# Patient Record
Sex: FEMALE | Race: WHITE
[De-identification: names, ages, dates, MRNs, and addresses within clinical notes are randomized per-mention and may not be internally consistent; named-entity substitution may affect disease eponyms.]

---

## 2018-01-17 ENCOUNTER — HOSPITAL ENCOUNTER (EMERGENCY)
Dept: HOSPITAL 25 - ED | Age: 33
Discharge: HOME | End: 2018-01-17
Payer: MEDICARE

## 2018-01-17 VITALS — SYSTOLIC BLOOD PRESSURE: 145 MMHG | DIASTOLIC BLOOD PRESSURE: 67 MMHG

## 2018-01-17 DIAGNOSIS — J18.9: ICD-10-CM

## 2018-01-17 DIAGNOSIS — Z88.3: ICD-10-CM

## 2018-01-17 DIAGNOSIS — Z88.8: ICD-10-CM

## 2018-01-17 DIAGNOSIS — Z88.0: ICD-10-CM

## 2018-01-17 DIAGNOSIS — R07.89: ICD-10-CM

## 2018-01-17 DIAGNOSIS — J45.909: Primary | ICD-10-CM

## 2018-01-17 LAB
BASOPHILS # BLD AUTO: 0.1 10^3/UL (ref 0–0.2)
EOSINOPHIL # BLD AUTO: 0.1 10^3/UL (ref 0–0.6)
HCT VFR BLD AUTO: 37 % (ref 35–47)
HGB BLD-MCNC: 12.7 G/DL (ref 12–16)
INR PPP/BLD: 0.91 (ref 0.77–1.02)
LYMPHOCYTES # BLD AUTO: 3.1 10^3/UL (ref 1–4.8)
MCH RBC QN AUTO: 31 PG (ref 27–31)
MCHC RBC AUTO-ENTMCNC: 35 G/DL (ref 31–36)
MCV RBC AUTO: 89 FL (ref 80–97)
MONOCYTES # BLD AUTO: 1.1 10^3/UL (ref 0–0.8)
NEUTROPHILS # BLD AUTO: 12.8 10^3/UL (ref 1.5–7.7)
NRBC # BLD AUTO: 0 10^3/UL
NRBC BLD QL AUTO: 0
PLATELET # BLD AUTO: 250 10^3/UL (ref 150–450)
RBC # BLD AUTO: 4.15 10^6/UL (ref 4–5.4)
WBC # BLD AUTO: 17.2 10^3/UL (ref 3.5–10.8)

## 2018-01-17 PROCEDURE — 99284 EMERGENCY DEPT VISIT MOD MDM: CPT

## 2018-01-17 PROCEDURE — 85025 COMPLETE CBC W/AUTO DIFF WBC: CPT

## 2018-01-17 PROCEDURE — 93005 ELECTROCARDIOGRAM TRACING: CPT

## 2018-01-17 PROCEDURE — 84702 CHORIONIC GONADOTROPIN TEST: CPT

## 2018-01-17 PROCEDURE — 71045 X-RAY EXAM CHEST 1 VIEW: CPT

## 2018-01-17 PROCEDURE — 83880 ASSAY OF NATRIURETIC PEPTIDE: CPT

## 2018-01-17 PROCEDURE — 84484 ASSAY OF TROPONIN QUANT: CPT

## 2018-01-17 PROCEDURE — 80053 COMPREHEN METABOLIC PANEL: CPT

## 2018-01-17 PROCEDURE — 85730 THROMBOPLASTIN TIME PARTIAL: CPT

## 2018-01-17 PROCEDURE — 36415 COLL VENOUS BLD VENIPUNCTURE: CPT

## 2018-01-17 PROCEDURE — 85379 FIBRIN DEGRADATION QUANT: CPT

## 2018-01-17 PROCEDURE — 85610 PROTHROMBIN TIME: CPT

## 2018-01-17 PROCEDURE — 94640 AIRWAY INHALATION TREATMENT: CPT

## 2018-01-17 PROCEDURE — 87040 BLOOD CULTURE FOR BACTERIA: CPT

## 2018-01-17 NOTE — RAD
INDICATION: Short of breath     



COMPARISON: None

 

TECHNIQUE: An AP portable view obtained at 0410 hours is submitted.



FINDINGS: 



Bones/Soft Tissues: There are no acute bony findings.    



Cardiomediastinal: The cardiomediastinal silhouette is normal. 



Lungs: There are no infiltrates.



Pleura: There are no pleural effusions. 



Other: None



IMPRESSION:  NO ACTIVE DISEASE.

## 2018-01-18 NOTE — ED
Deepak OTOOLE Nilda, scribed for Allison Crowley MD on 01/17/18 at 0354 .





HPI Chest Pain





- HPI Summary


HPI Summary: 


This patient is a 32 year old F BIBA with a chief complaint of constant chest 

tightness since last night. The patient rates the pain 7/10 in severity. 

Symptoms aggravated by deep inspiration. Patient reports cough, SOB, and 

dizziness, but denies fever. She states she believes this is not a panic attack 

since she was at rest when symptoms occurred. PMHx includes asthma, fibromyalgia

, PCOS, panic attacks, heart murmur. Medications include Spirinol. Pt notes she 

has been on birth control for years, which was discontinued 8 months ago. 





- History of Current Complaint


Chief Complaint: EDShortnessOfBreath


Time Seen by Provider: 01/17/18 03:03


Hx Obtained From: Patient


Hx Last Menstrual Period: Birth Control Pill


Onset/Duration: Started Hours Ago, Still Present


Timing: Constant


Current Severity: Severe


Pain Intensity: 7


Pain Scale Used: 0-10 Numeric


Chest Pain Location: Diffuse


Chest Pain Radiates: No


Character: Dyspnea at Rest


Aggravating Factor(s): Deep Breaths


Associated Signs and Symptoms: Positive: Other: - cough with deep inspiration, 

SOB, and dizziness, but denies fever.





- Allergy/Home Medications


Allergies/Adverse Reactions: 


 Allergies











Allergy/AdvReac Type Severity Reaction Status Date / Time


 


Clindamycin Allergy Intermediate Hives Verified 07/29/13 12:17


 


Latex Allergy Intermediate Rash Verified 07/29/13 12:16


 


Penicillins Allergy Intermediate Nausea Verified 07/29/13 12:16


 


Bupropion [From Wellbutrin] Allergy  See Comment Verified 07/29/13 12:17


 


Carisoprodol [From Soma] Allergy  Vertigo Verified 07/29/13 12:18


 


Ciprofloxacin [From Cipro] Allergy  Hives Verified 07/29/13 12:18


 


Diphenhydramine Allergy  See Comment Verified 06/27/16 10:32





[From Benadryl]     


 


cockroach Allergy  Unknown Uncoded 06/27/16 10:31





   Reaction  





   Details  














PMH/Surg Hx/FS Hx/Imm Hx


Endocrine/Hematology History: 


   Denies: Hx Diabetes, Hx Thyroid Disease


Cardiovascular History: Reports: Other Cardiovascular Problems/Disorders - 

Heart Murmur


   Denies: Hx Hypertension


Respiratory History: Reports: Hx Asthma - Mild exercise induced


   Denies: Hx Chronic Obstructive Pulmonary Disease (COPD)


GI History: 


   Denies: Hx Ulcer


 History: Reports: Other  Problems/Disorders - PCOS


Musculoskeletal History: Reports: Hx Fibromyalgia


Psychiatric History: Reports: Other Psychiatric Issues/Disorders - Panic attack





- Surgical History


Surgery Procedure, Year, and Place: Cyst removal back of head 2008, Gallbladder 

removal, ganglion cyst removed from left wrist, tonsillectomy 1989.





- Immunization History


Date of Influenza Vaccine: none


Infectious Disease History: No


Infectious Disease History: 


   Denies: Hx Clostridium Difficile, Hx Hepatitis, Hx Human Immunodeficiency 

Virus (HIV), Hx of Known/Suspected MRSA, Hx Shingles, Hx Tuberculosis, Hx Known/

Suspected VRSA, History Other Infectious Disease, Traveled Outside the [place] in 

Last 30 Days





- Family History


Known Family History: Positive: Cardiac Disease





- Social History


Alcohol Use: None


Substance Use Type: Reports: None


Substance Use Comment - Amount & Last Used: one inhalation 2 weeks ago


Smoking Status (MU): Never Smoked Tobacco





Review of Systems


Negative: Fever


Positive: Chest Pain - tightness


Positive: Shortness Of Breath, Cough - with deep inspiration


Positive: Other - indigestion


Neurological: Other - dizziness


All Other Systems Reviewed And Are Negative: Yes





Physical Exam





- Summary


Physical Exam Summary: 


VITAL SIGNS: Reviewed.


GENERAL:  Patient is a well-developed and nourished female who is lying 

comfortable in the stretcher. Patient is not in any acute respiratory distress.


HEAD AND FACE: No signs of trauma. No ecchymosis, hematomas or skull 

depressions. No sinus tenderness.


EYES: PERRLA, EOMI x 2, No injected conjunctiva, no nystagmus.


EARS: Hearing grossly intact. Ear canals and tympanic membranes are within 

normal limits.


MOUTH: Oropharynx within normal limits.


NECK: Supple, trachea is midline, no adenopathy, no JVD, no carotid bruit, no c-

spine tenderness, neck with full ROM.


CHEST: Symmetric, no tenderness at palpation


LUNGS: Clear to auscultation bilaterally. No wheezing or crackles. Decreased 

breath sounds bilat.


CVS: Mild Tachycardia. S1 and S2 present, no murmurs or gallops appreciated.


ABDOMEN: Soft, non-tender. No signs of distention. No rebound no guarding, and 

no masses palpated. Bowel sounds are normal.


EXTREMITIES: FROM in all major joints, no edema, no cyanosis or clubbing.


NEURO: Alert and oriented x 3. No acute neurological deficits. Speech is normal 

and follows commands.


Triage Information Reviewed: Yes


Vital Signs On Initial Exam: 


 Initial Vitals











Temp Pulse Resp BP Pulse Ox


 


 97.8 F   114   12   159/97   96 


 


 01/17/18 03:02  01/17/18 03:02  01/17/18 03:02  01/17/18 03:02  01/17/18 03:02











Vital Signs Reviewed: Yes





- Stan Coma Scale


Coma Scale Total: 15





Diagnostics





- Vital Signs


 Vital Signs











  Temp Pulse Resp BP Pulse Ox


 


 01/17/18 03:35   108  16   94


 


 01/17/18 03:23   111  19   96


 


 01/17/18 03:02  97.8 F  114  12  159/97  96














- Laboratory


Result Diagrams: 


 01/17/18 03:40





 01/17/18 03:40


Lab Statement: Any lab studies that have been ordered have been reviewed, and 

results considered in the medical decision making process.





- Radiology


  ** CXR


Radiology Interpretation Completed By: ED Physician - Bilat basal atelectasis 

vs infiltrate.





- EKG


  ** 0349


Cardiac Rate: Tachycardia


EKG Rhythm: Sinus Tachycardia - 127 bpm


EKG Interpretation: normal axis, normal interval, nonspecific T- wave changes. 





Re-Evaluation





- Re-Evaluation


  ** First Eval


Re-Evaluation Time: 04:42


Comment: Reviewed labs and imaging results with pt.





Chest Pain Course/Dx





- Course


Assessment/Plan: This pt is a 32 F with Hx of asthma having difficulty 

breathing and coughing.  Exam revealed decreased breath sound bilat. CXR showed 

atelectasis vs infiltrate. Pt given abx to be used along with inhaler. Pt will 

be D/C with Dx of PNA and asthma. Pt advised to follow up with PCP.





- Diagnoses


Provider Diagnoses: 


 Asthma, Pneumonia








Discharge





- Discharge Plan


Condition: Stable


Disposition: HOME


Prescriptions: 


Albuterol HFA INHALER* [Ventolin HFA Inhaler*] 2 puff INH Q6H PRN #1 mdi


 PRN Reason: Dyspnea


Levofloxacin TAB* [Levaquin TAB*] 750 mg PO DAILY #7 tab


predniSONE TAB* [Deltasone TAB*] 40 mg PO DAILY #10 tab


Patient Education Materials:  Asthma (ED), Pneumonia (ED)


Referrals: 


[facility] [place] [facility]YS[facility]IAN REFERRAL [Outside] - 3 Days


Additional Instructions: 


RETURN TO THE EMERGENCY DEPARTMENT FOR CHANGING OR WORSENING SYMPTOMS.





The documentation as recorded by the scribDeepak mark Nilda accurately 

reflects the service I personally performed and the decisions made by me, Allison Crowley MD.

## 2018-01-24 ENCOUNTER — HOSPITAL ENCOUNTER (EMERGENCY)
Dept: HOSPITAL 25 - ED | Age: 33
Discharge: HOME | End: 2018-01-24
Payer: MEDICARE

## 2018-01-24 ENCOUNTER — HOSPITAL ENCOUNTER (EMERGENCY)
Dept: HOSPITAL 25 - UCEAST | Age: 33
Discharge: LEFT BEFORE DISCHARGE | End: 2018-01-24
Payer: MEDICARE

## 2018-01-24 VITALS — DIASTOLIC BLOOD PRESSURE: 79 MMHG | SYSTOLIC BLOOD PRESSURE: 147 MMHG

## 2018-01-24 VITALS — DIASTOLIC BLOOD PRESSURE: 80 MMHG | SYSTOLIC BLOOD PRESSURE: 130 MMHG

## 2018-01-24 DIAGNOSIS — Z53.21: ICD-10-CM

## 2018-01-24 DIAGNOSIS — J20.9: Primary | ICD-10-CM

## 2018-01-24 DIAGNOSIS — R06.02: ICD-10-CM

## 2018-01-24 DIAGNOSIS — R50.9: ICD-10-CM

## 2018-01-24 DIAGNOSIS — R11.0: ICD-10-CM

## 2018-01-24 DIAGNOSIS — J06.9: Primary | ICD-10-CM

## 2018-01-24 LAB
BASOPHILS # BLD AUTO: 0.1 10^3/UL (ref 0–0.2)
EOSINOPHIL # BLD AUTO: 0.3 10^3/UL (ref 0–0.6)
HCT VFR BLD AUTO: 40 % (ref 35–47)
HGB BLD-MCNC: 13.8 G/DL (ref 12–16)
LYMPHOCYTES # BLD AUTO: 4.4 10^3/UL (ref 1–4.8)
MCH RBC QN AUTO: 31 PG (ref 27–31)
MCHC RBC AUTO-ENTMCNC: 34 G/DL (ref 31–36)
MCV RBC AUTO: 89 FL (ref 80–97)
MONOCYTES # BLD AUTO: 1.1 10^3/UL (ref 0–0.8)
NEUTROPHILS # BLD AUTO: 8.8 10^3/UL (ref 1.5–7.7)
NRBC # BLD AUTO: 0 10^3/UL
NRBC BLD QL AUTO: 0.1
PLATELET # BLD AUTO: 290 10^3/UL (ref 150–450)
RBC # BLD AUTO: 4.51 10^6/UL (ref 4–5.4)
WBC # BLD AUTO: 14.7 10^3/UL (ref 3.5–10.8)

## 2018-01-24 PROCEDURE — 85025 COMPLETE CBC W/AUTO DIFF WBC: CPT

## 2018-01-24 PROCEDURE — 71046 X-RAY EXAM CHEST 2 VIEWS: CPT

## 2018-01-24 PROCEDURE — 87502 INFLUENZA DNA AMP PROBE: CPT

## 2018-01-24 PROCEDURE — 36415 COLL VENOUS BLD VENIPUNCTURE: CPT

## 2018-01-24 PROCEDURE — 80048 BASIC METABOLIC PNL TOTAL CA: CPT

## 2018-01-24 PROCEDURE — 99282 EMERGENCY DEPT VISIT SF MDM: CPT

## 2018-01-24 NOTE — RAD
Indication: Cough.



2 views of the chest including dual energy PA views demonstrate no mediastinal shift.

Heart is of normal size and configuration. Lung fields appear clear.



When compared to previous exam of January 17, 2018 no significant change is noted.



IMPRESSION: No active cardiopulmonary disease is noted.

## 2018-01-25 NOTE — ED
rAacely OTOOLE Julia, scribed for Noel Dennis MD on 01/24/18 at 2032 .





Complex/Multi-Sys Presentation





- HPI Summary


HPI Summary: 





This patient is a 32 year old F presenting to [facility] accompanied by family with 

a chief complaint of intermittent fever of since last 1/16/18. Patient reports 

lung pain, nausea, and recent difficult breathing.  The patient rates the pain 

6/10 in severity. Symptoms alleviated by medications. Patient was recently seen 

in ED and was given Levaquin and Prednisone, which gave relief initially but 

symptoms are worsening.  Pt given antibiotics on 1/22/18. She sates she would 

like her potassium levels checked.





- History Of Current Complaint


Chief Complaint: EDFever


Time Seen by Provider: 01/24/18 20:17


Hx Obtained From: Patient


Onset/Duration: Gradual Onset, Lasting Weeks


Timing: Intermittent, Lasting: - fever


Location: Pain At: - lungs


Aggravating Factor(s): nothing


Alleviating Factor(s): medications


Associated Signs And Symptoms: Positive: SOB, Nausea, Fever





- Allergies/Home Medications


Allergies/Adverse Reactions: 


 Allergies











Allergy/AdvReac Type Severity Reaction Status Date / Time


 


Clindamycin Allergy Intermediate Hives Verified 07/29/13 12:17


 


Latex Allergy Intermediate Rash Verified 07/29/13 12:16


 


Penicillins Allergy Intermediate Nausea Verified 07/29/13 12:16


 


Bupropion [From Wellbutrin] Allergy  See Comment Verified 07/29/13 12:17


 


Carisoprodol [From Soma] Allergy  Vertigo Verified 07/29/13 12:18


 


Ciprofloxacin [From Cipro] Allergy  Hives Verified 07/29/13 12:18


 


Diphenhydramine Allergy  See Comment Verified 06/27/16 10:32





[From Benadryl]     


 


cockroach Allergy  Unknown Uncoded 06/27/16 10:31





   Reaction  





   Details  














PMH/Surg Hx/FS Hx/Imm Hx


Endocrine/Hematology History: 


   Denies: Hx Diabetes, Hx Thyroid Disease


Cardiovascular History: Reports: Other Cardiovascular Problems/Disorders - 

Heart Murmur


   Denies: Hx Hypertension


Respiratory History: Reports: Hx Asthma - Mild exercise induced


   Denies: Hx Chronic Obstructive Pulmonary Disease (COPD)


GI History: 


   Denies: Hx Ulcer


 History: Reports: Other  Problems/Disorders - PCOS


Musculoskeletal History: Reports: Hx Fibromyalgia


Neurological History: Reports: Other Neuro Impairments/Disorders - un dx 

seizures 


Psychiatric History: Reports: Other Psychiatric Issues/Disorders - Panic attack





- Surgical History


Surgery Procedure, Year, and Place: Cyst removal back of head 2008, Gallbladder 

removal, ganglion cyst removed from left wrist, tonsillectomy 1989.





- Immunization History


Date of Influenza Vaccine: none


Infectious Disease History: No


Infectious Disease History: 


   Denies: Hx Clostridium Difficile, Hx Hepatitis, Hx Human Immunodeficiency 

Virus (HIV), Hx of Known/Suspected MRSA, Hx Shingles, Hx Tuberculosis, Hx Known/

Suspected VRSA, History Other Infectious Disease, Traveled Outside the [place] in 

Last 30 Days





- Family History


Known Family History: Positive: Cardiac Disease





- Social History


Alcohol Use: None


Substance Use Type: Reports: None


Substance Use Comment - Amount & Last Used: one inhalation 2 weeks ago


Smoking Status (MU): Never Smoked Tobacco





Review of Systems


Positive: Fever


Positive: Shortness Of Breath, Other - "lung pain"


Positive: Nausea


All Other Systems Reviewed And Are Negative: Yes





Physical Exam





- Summary


Physical Exam Summary: 





Appearance: Well appearing, no pain distress


Skin: warm, dry, reflects adequate perfusion


Head/face: normal


Eyes: EOMI, NAREN


ENT: normal, clear nasal discharge, throat is clear


Neck: supple, non-tender


Respiratory: CTA, breath sounds present


Cardiovascular: RRR, pulses symmetrical 


Abdomen: non-tender, soft


Bowel: present


Musculoskeletal: normal, strength/ROM intact


Neuro: normal, sensory motor intact, A&Ox3





Triage Information Reviewed: Yes


Vital Signs On Initial Exam: 


 Initial Vitals











Temp Pulse Resp BP Pulse Ox


 


 97.5 F   100   18   170/87   98 


 


 01/24/18 18:33  01/24/18 18:33  01/24/18 18:33  01/24/18 18:33  01/24/18 18:33











Vital Signs Reviewed: Yes





Diagnostics





- Vital Signs


 Vital Signs











  Temp Pulse Resp BP Pulse Ox


 


 01/24/18 18:33  97.5 F  100  18  170/87  98














- Laboratory


Lab Results: 


 Lab Results











  01/24/18 01/24/18 01/24/18 Range/Units





  20:35 20:35 20:51 


 


WBC   14.7 H   (3.5-10.8)  10^3/ul


 


RBC   4.51   (4.0-5.4)  10^6/ul


 


Hgb   13.8   (12.0-16.0)  g/dl


 


Hct   40   (35-47)  %


 


MCV   89   (80-97)  fL


 


MCH   31   (27-31)  pg


 


MCHC   34   (31-36)  g/dl


 


RDW   13   (10.5-15)  %


 


Plt Count   290   (150-450)  10^3/ul


 


MPV   7 L   (7.4-10.4)  um3


 


Neut % (Auto)   59.6   (38-83)  %


 


Lymph % (Auto)   29.8   (25-47)  %


 


Mono % (Auto)   7.7   (1-9)  %


 


Eos % (Auto)   2.1   (0-6)  %


 


Baso % (Auto)   0.8   (0-2)  %


 


Absolute Neuts (auto)   8.8 H   (1.5-7.7)  10^3/ul


 


Absolute Lymphs (auto)   4.4   (1.0-4.8)  10^3/ul


 


Absolute Monos (auto)   1.1 H   (0-0.8)  10^3/ul


 


Absolute Eos (auto)   0.3   (0-0.6)  10^3/ul


 


Absolute Basos (auto)   0.1   (0-0.2)  10^3/ul


 


Absolute Nucleated RBC   0   10^3/ul


 


Nucleated RBC %   0.1   


 


Sodium  134    (133-145)  mmol/L


 


Potassium  3.7    (3.5-5.0)  mmol/L


 


Chloride  99 L    (101-111)  mmol/L


 


Carbon Dioxide  26    (22-32)  mmol/L


 


Anion Gap  9    (2-11)  mmol/L


 


BUN  8    (6-24)  mg/dL


 


Creatinine  0.70    (0.51-0.95)  mg/dL


 


Est GFR ( Amer)  125.5    (>60)  


 


Est GFR (Non-Af Amer)  97.6    (>60)  


 


BUN/Creatinine Ratio  11.4    (8-20)  


 


Glucose  146 H    ()  mg/dL


 


Calcium  9.4    (8.6-10.3)  mg/dL


 


Influenza A (Rapid)    Negative  (Negative)  


 


Influenza B (Rapid)    Negative  (Negative)  











Result Diagrams: 


 01/24/18 20:35





 01/24/18 20:35


Lab Statement: Any lab studies that have been ordered have been reviewed, and 

results considered in the medical decision making process.





- Radiology


  ** CXR


Radiology Interpretation Completed By: Radiologist - No active cardiopulmonary 

disease is noted. ED Physician has reviewed this report.





Complex Multi-Symp Course/Dx


Course Of Treatment: Patient presents with fever, diffiuculty breathing and 

"lung pain". Patient recently seen for similar symptoms and reports relief with

  Levaquin and Prednisone. CXR here is unremarkable as was the previous. Flu 

swab is negative. Labs are unremarkable. Patient is dc with dx of acute 

bronchitis and intstructed to follow up with a PCP with sx control. Sats 100, 

no fever.





- Diagnoses


Differential Diagnoses/HQI/PQRI: Other - pneumonia, bronchitis, pleurisy, viral 

synd, PE


Provider Diagnoses: 


 Acute bronchitis








Discharge





- Discharge Plan


Condition: Good


Disposition: HOME


Prescriptions: 


Benzonatate [TESSALON 200 MG CAP] 200 mg PO Q6H PRN #20 cap


 PRN Reason: Cough


Pseudoephedrine-Guaifenesin [Mucinex D  mg] 1 tab PO BID PRN #14 tab


 PRN Reason: Congestion


Patient Education Materials:  Acute Bronchitis (ED)


Referrals: 


[facility] [place]


Additional Instructions: 


Continue albuterol. Hydrate well. Tylenol/ibuprofen as needed for fever. Return 

if worse, high fevers, vomiting, trouble breathing or other concerns as 

discussed.





The documentation as recorded by the Aracely randolph Julia accurately reflects 

the service I personally performed and the decisions made by me, Noel Dennis MD.

## 2018-04-29 ENCOUNTER — HOSPITAL ENCOUNTER (EMERGENCY)
Dept: HOSPITAL 25 - ED | Age: 33
Discharge: HOME | End: 2018-04-29
Payer: MEDICARE

## 2018-04-29 VITALS — SYSTOLIC BLOOD PRESSURE: 112 MMHG | DIASTOLIC BLOOD PRESSURE: 91 MMHG

## 2018-04-29 DIAGNOSIS — R45.1: ICD-10-CM
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
## 2018-04-29 DIAGNOSIS — Z88.8: ICD-10-CM

## 2018-04-29 DIAGNOSIS — Z88.3: ICD-10-CM

## 2018-04-29 DIAGNOSIS — T44.905A: ICD-10-CM

## 2018-04-29 DIAGNOSIS — F12.10: ICD-10-CM

## 2018-04-29 DIAGNOSIS — Z88.0: ICD-10-CM

## 2018-04-29 DIAGNOSIS — R00.0: Primary | ICD-10-CM

## 2018-04-29 PROCEDURE — 99282 EMERGENCY DEPT VISIT SF MDM: CPT

## 2018-04-29 PROCEDURE — 96374 THER/PROPH/DIAG INJ IV PUSH: CPT

## 2018-04-29 PROCEDURE — 96361 HYDRATE IV INFUSION ADD-ON: CPT

## 2018-04-29 NOTE — ED
Kourtney OTOOLE Emily, scribed for Noel Dennis MD on 04/29/18 at 0527 .





Substance Abuse/Use





- HPI Summary


HPI Summary: 


This patient is a 32 year old F BIBA to [facility] with a chief complaint of concern 

for seizure that occurred PTA. Pt reports taking 4 hits of marijuana around 

1500 yesterday, before symptoms began.  Symptoms aggravated by nothing. 

Symptoms alleviated by nothing. Patient reports mood swing, palpitations, 

blurred vision, numbness, weakness, and nausea (began 2 days ago). Patient 

denies diaphoresis, abd pain, vomiting, and diarrhea. Pt reports a history of 

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








- History Of Current Complaint


Stated Complaint: GENERAL


Time Seen by Provider: 04/29/18 05:21


Hx Obtained From: Patient


Hx Last Menstrual Period: 4/27/2018


Pregnant?: No


Ingestion History: Type/Name Of Drug - Marijuana


Overdose Characteristics: Inhalation


Aggravating Factor(s): Nothing


Alleviating Factor(s): Nothing


Associated Signs And Symptoms: Other: - Positive mood swing, palpitations, 

blurred vision, numbness, weakness, and nausea (began 2 days ago). Negative 

diaphoresis, abd pain, vomiting, and diarrhea





- Allergies/Home Medications


Allergies/Adverse Reactions: 


 Allergies











Allergy/AdvReac Type Severity Reaction Status Date / Time


 


MS Clindamycin [Clindamycin] Allergy Intermediate Hives Verified 07/29/13 12:17


 


MS Latex [Latex] Allergy Intermediate Rash Verified 07/29/13 12:16


 


MS Penicillins [Penicillins] Allergy Intermediate Nausea Verified 07/29/13 12:16


 


MS Bupropion Allergy  See Comment Verified 07/29/13 12:17





[From Wellbutrin]     


 


MS Carisoprodol [From Soma] Allergy  Vertigo Verified 07/29/13 12:18


 


MS Ciprofloxacin [From Cipro] Allergy  Hives Verified 07/29/13 12:18


 


MS Diphenhydramine Allergy  See Comment Verified 06/27/16 10:32





[From Benadryl]     


 


cockroach Allergy  Unknown Uncoded 06/27/16 10:31





   Reaction  





   Details  














PMH/Surg Hx/FS Hx/Imm Hx


Previously Healthy: No


Endocrine/Hematology History: 


   Denies: Hx Diabetes, Hx Thyroid Disease


Cardiovascular History: Reports: Other Cardiovascular Problems/Disorders - 

Heart Murmur


   Denies: Hx Hypertension


Respiratory History: Reports: Hx Asthma - Mild exercise induced


   Denies: Hx Chronic Obstructive Pulmonary Disease (COPD)


GI History: 


   Denies: Hx Ulcer


 History: Reports: Other  Problems/Disorders - PCOS


Musculoskeletal History: Reports: Hx Fibromyalgia


Neurological History: Reports: Other Neuro Impairments/Disorders - un dx 

seizures 


Psychiatric History: Reports: Other Psychiatric Issues/Disorders - Panic attack





- Surgical History


Surgery Procedure, Year, and Place: Cyst removal back of head 2008, Gallbladder 

removal, ganglion cyst removed from left wrist, tonsillectomy 1989.





- Immunization History


Date of Influenza Vaccine: none


Infectious Disease History: 


   Denies: Hx Clostridium Difficile, Hx Hepatitis, Hx Human Immunodeficiency 

Virus (HIV), Hx of Known/Suspected MRSA, Hx Shingles, Hx Tuberculosis, Hx Known/

Suspected VRSA, History Other Infectious Disease





- Family History


Known Family History: Positive: Cardiac Disease





- Social History


Occupation: Disabled


Lives: Alone


Alcohol Use: None


Substance Use Type: Reports: None


Substance Use Comment - Amount & Last Used: one inhalation 2 weeks ago


Hx Tobacco Use: No


Smoking Status (MU): Never Smoked Tobacco





Review of Systems


Negative: Skin Diaphoresis


Positive: Blurred Vision


Positive: Palpitations


Negative: Abdominal Pain, Vomiting, Diarrhea


Positive: Weakness, Numbness


Psychological: Other - Positive mood swing


All Other Systems Reviewed And Are Negative: Yes





Physical Exam





- Summary


Physical Exam Summary: 


Appearance: Well appearing, no pain distress


Skin: warm, dry, reflects adequate perfusion


Head/face: normal


Eyes: EOMI, NAREN, nystagmus


ENT: normal


Neck: supple, non-tender


Respiratory: CTA, breath sounds present


Cardiovascular: tachycardic, pulses symmetrical 


Abdomen: non-tender, soft


Bowel Sounds: present


Musculoskeletal: normal, strength/ROM intact


Neuro: normal, sensory motor intact, A&Ox3





Triage Information Reviewed: Yes


Vital Signs On Initial Exam: 


 Initial Vitals











Temp Pulse Resp BP Pulse Ox


 


 98.9 F   119   22   152/118   100 


 


 04/29/18 05:25  04/29/18 05:25  04/29/18 05:25  04/29/18 05:25  04/29/18 05:25











Vital Signs Reviewed: Yes





Diagnostics





- Vital Signs


 Vital Signs











  Temp Pulse Resp BP Pulse Ox


 


 04/29/18 05:25  98.9 F  119  22  152/118  100














- Laboratory


Lab Statement: Any lab studies that have been ordered have been reviewed, and 

results considered in the medical decision making process.





Re-Evaluation





- Re-Evaluation


  ** First Eval


Re-Evaluation Time: 06:18


Change: Unchanged


Comment: Pt refuses Ativan because she has an adverse reaction





  ** Second Eval


Re-Evaluation Time: 06:50


Change: Unchanged


Comment: Pt is willing to take Ativan now





Course/Dx





- Course


Course Of Treatment: Patient with history of nonepileptic seizures and has been 

trying to self medicate with recreational marijuana.  She presents with 

tachycardia and some agitation after smoking the marijuana.  His possible that 

she receive some sympathomimetic within drug.  She originally refused Ativan 

stating that this increased her seizures.  She understands that this is the 

first line treatment for seizures and will take the medication.  She is also 

hydrated here.  She has signed out to the oncoming ER physician pending Ativan 

therapy.





- Diagnoses


Provider Diagnoses: 


 Adv eff sympathomimetics, Nonepileptic episode, Marijuana abuse








Discharge





- Sign-Out/Discharge


Documenting (check all that apply): Sign-Out Patient


Signing out patient TO: Juany Diane





- Discharge Plan


Condition: Stable


Discharge Disposition Comment: Sign out to Dr. Diane upon shift change pending 

Ativan treatment





- Billing Disposition and Condition


Condition: STABLE





The documentation as recorded by the Kourtney randolph Emily accurately reflects the 

service I personally performed and the decisions made by me, Noel Dennis MD.

## 2018-04-30 NOTE — ED
Mei OTOOLE Edward, scribed for Juany Diane MD on 04/29/18 at 0711 .





Progress





- Progress Note


Progress Note: 





Pt signed out from Dr. Dennis pending Ativan.





Re-Evaluation





- Re-Evaluation


  ** First Eval


Re-Evaluation Time: 06:18


Change: Unchanged


Comment: Pt refuses Ativan because she has an adverse reaction





  ** Second Eval


Re-Evaluation Time: 06:50


Change: Unchanged


Comment: Pt is willing to take Ativan now





  ** 3rd (Benedicto 1st)


Re-Evaluation Time: 09:00


Change: Improved





Course/Dx





- Course


Course Of Treatment: 31 y/o female presents with seizure activity. Has a 

history of pseudoseizures. EEG normal. Self medicated with marijuana. Signed 

out to me pending Ativan. Upon reeval pt much improved. Pt is hemodynamically 

stable and safe for d/c home with strict return precautions; otherwise f/u with 

neurologist.





- Diagnoses


Provider Diagnoses: 


 Adv eff sympathomimetics, Nonepileptic episode, Marijuana abuse








Discharge





- Sign-Out/Discharge


Documenting (check all that apply): Receiving Sign-Out


Signing out patient TO: Juany Diane


Receiving patient FROM: Noel Dennis





- Discharge Plan


Condition: Stable


Disposition: HOME


Patient Education Materials:  Nonepileptic Seizures (ED)


Referrals: 


[facility] [place]FERR[place] [Outside] - 4 Days (PLEASE F/U IN 3-5 DAYS)


Sean Jennings MD [Medical Doctor] - 4 Days (PLEASE F/U IN 3-5 DAYS)


Additional Instructions: 


RETURN TO THE ED FOR CHANGING OR WORSENING SYMPTOMS





The documentation as recorded by the Mei randolph Edward accurately reflects the 

service I personally performed and the decisions made by me, Juany Diane MD.

## 2018-06-24 ENCOUNTER — HOSPITAL ENCOUNTER (EMERGENCY)
Dept: HOSPITAL 25 - ED | Age: 33
Discharge: HOME | End: 2018-06-24
Payer: MEDICARE

## 2018-06-24 VITALS — SYSTOLIC BLOOD PRESSURE: 146 MMHG | DIASTOLIC BLOOD PRESSURE: 92 MMHG

## 2018-06-24 DIAGNOSIS — Z88.0: ICD-10-CM

## 2018-06-24 DIAGNOSIS — Z23: ICD-10-CM

## 2018-06-24 DIAGNOSIS — Z88.8: ICD-10-CM
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
## 2018-06-24 DIAGNOSIS — Z88.3: ICD-10-CM

## 2018-06-24 DIAGNOSIS — S61.211A: Primary | ICD-10-CM
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
## 2018-06-24 PROCEDURE — 90715 TDAP VACCINE 7 YRS/> IM: CPT

## 2018-06-24 PROCEDURE — 99282 EMERGENCY DEPT VISIT SF MDM: CPT

## 2018-06-24 PROCEDURE — 90471 IMMUNIZATION ADMIN: CPT

## 2018-06-24 NOTE — ED
Laceration/Wound HPI





- HPI Summary


HPI Summary: 





Complains of cutting distal tip of left index finger with a cutting knife.  

Denies loss of sensation or function.  Bleeding controlled.  No anti-coag.  HX  

of DM





- History of Current Complaint


Stated Complaint: LT POINTER FINGER LAC


Time Seen by Provider: 06/24/18 17:37


Hx Obtained From: Patient


Hx Last Menstrual Period: 4/27/2018


Onset/Duration: Sudden Onset


Current Severity: Moderate


Pain Intensity: 7


Pain Scale Used: 0-10 Numeric


Associated Signs & Symptoms: Pain





- Allergy/Home Medications


Allergies/Adverse Reactions: 


 Allergies











Allergy/AdvReac Type Severity Reaction Status Date / Time


 


bupropion [From Wellbutrin] Allergy  See Comment Verified 06/24/18 17:23


 


carisoprodol [From Soma] Allergy  Vertigo Verified 06/24/18 17:23


 


ciprofloxacin Allergy  Hives Verified 06/24/18 17:23


 


clindamycin Allergy  Hives Verified 06/24/18 17:23


 


diphenhydramine Allergy  See Comment Verified 06/24/18 17:23





[From Benadryl]     


 


latex Allergy  Rash Verified 06/24/18 17:23


 


Penicillins Allergy  Nausea Verified 06/24/18 17:23


 


SSRI Allergy  Unknown Uncoded 06/24/18 17:24





   Reaction  





   Details  














PMH/Surg Hx/FS Hx/Imm Hx


Endocrine/Hematology History: 


   Denies: Hx Anticoagulant Therapy, Hx Diabetes, Hx Thyroid Disease


Cardiovascular History: Reports: Other Cardiovascular Problems/Disorders - 

Heart Murmur


   Denies: Hx Cardiac Arrest, Hx Hypertension


Respiratory History: Reports: Hx Asthma - Mild exercise induced


   Denies: Hx Chronic Obstructive Pulmonary Disease (COPD)


GI History: 


   Denies: Hx Ulcer


 History: Reports: Other  Problems/Disorders - PCOS


   Denies: Hx Dialysis


Musculoskeletal History: Reports: Hx Fibromyalgia


EENT History: 


   Denies: Hx Deafness


Neurological History: Reports: Other Neuro Impairments/Disorders - un dx 

seizures 


   Denies: Hx CVA


Psychiatric History: Reports: Other Psychiatric Issues/Disorders - Panic attack





- Surgical History


Surgery Procedure, Year, and Place: Cyst removal back of head 2008, Gallbladder 

removal, ganglion cyst removed from left wrist, tonsillectomy 1989.





- Immunization History


Date of Tetanus Vaccine: unknown


Date of Influenza Vaccine: none


Infectious Disease History: No


Infectious Disease History: 


   Denies: Hx Clostridium Difficile, Hx Hepatitis, Hx Human Immunodeficiency 

Virus (HIV), Hx of Known/Suspected MRSA, Hx Shingles, Hx Tuberculosis, Hx Known/

Suspected VRSA, History Other Infectious Disease, Traveled Outside the [place] in 

Last 30 Days





- Family History


Known Family History: Positive: Cardiac Disease





- Social History


Alcohol Use: None


Substance Use Type: Reports: None


Substance Use Comment - Amount & Last Used: one inhalation 2 weeks ago


Hx Tobacco Use: No


Smoking Status (MU): Never Smoked Tobacco





Review of Systems


Constitutional: Negative


Eyes: Negative


ENT: Negative


Cardiovascular: Negative


Respiratory: Negative


Gastrointestinal: Negative


Genitourinary: Negative


Musculoskeletal: Negative


Skin: Negative


Neurological: Negative


Psychological: Normal


All Other Systems Reviewed And Are Negative: Yes





Physical Exam





- Summary


Physical Exam Summary: 





Amputation of Small portion of nail and distal tip of left index finger.  

Nailbed intact.  PMS intact.


Triage Information Reviewed: Yes


Vital Signs On Initial Exam: 


 Initial Vitals











Temp Pulse Resp BP Pulse Ox


 


 97.4 F   117   16   154/94   96 


 


 06/24/18 17:21  06/24/18 17:21  06/24/18 17:21  06/24/18 17:21  06/24/18 17:21











Vital Signs Reviewed: Yes


Appearance: Positive: Well-Appearing


Skin: Positive: Warm


Head/Face: Positive: Normal Head/Face Inspection


Eyes: Positive: Normal


Neck: Positive: Supple


Respiratory/Lung Sounds: Positive: Clear to Auscultation


Cardiovascular: Positive: Normal


Abdomen Description: Positive: Nontender


Musculoskeletal: Positive: Normal


Neurological: Positive: Normal


Psychiatric: Positive: Normal


AVPU Assessment: Alert





- Stan Coma Scale


Best Eye Response: 4 - Spontaneous


Best Motor Response: 6 - Obeys Commands


Best Verbal Response: 5 - Oriented


Coma Scale Total: 15





Diagnostics





- Vital Signs


 Vital Signs











  Temp Pulse Resp BP Pulse Ox


 


 06/24/18 17:21  97.4 F  117  16  154/94  96














- Laboratory


Lab Statement: Any lab studies that have been ordered have been reviewed, and 

results considered in the medical decision making process.





Laceration Repair Course/Dx





- Clinical Impression


Provider Diagnoses: 


 Laceration








Discharge





- Sign-Out/Discharge


Documenting (check all that apply): Discharge/Admit/Transfer





- Discharge Plan


Condition: Stable


Disposition: HOME


Prescriptions: 


Cephalexin CAP* [Keflex CAP*] 500 mg PO TID 7 Days #21 cap


Patient Education Materials:  Finger Laceration (ED), Laceration Without 

Closure (ED)


Referrals: 


No Primary Care Phys,NOPCP [Medical Doctor] - 


[facility] [Outside]


Additional Instructions: 


Keep wound clean, dry, protected.  Take antibiotics as directed.  Return to the 

ED for any new or worsening symptoms.





- Billing Disposition and Condition


Condition: STABLE


Disposition: Home

## 2019-07-08 ENCOUNTER — HOSPITAL ENCOUNTER (EMERGENCY)
Dept: HOSPITAL 25 - ED | Age: 34
Discharge: HOME | End: 2019-07-08
Payer: MEDICARE

## 2019-07-08 ENCOUNTER — HOSPITAL ENCOUNTER (EMERGENCY)
Dept: HOSPITAL 25 - UCEAST | Age: 34
Discharge: HOME HEALTH | End: 2019-07-08
Payer: MEDICARE

## 2019-07-08 VITALS — SYSTOLIC BLOOD PRESSURE: 107 MMHG | DIASTOLIC BLOOD PRESSURE: 80 MMHG

## 2019-07-08 VITALS — DIASTOLIC BLOOD PRESSURE: 80 MMHG | SYSTOLIC BLOOD PRESSURE: 147 MMHG

## 2019-07-08 DIAGNOSIS — R59.0: ICD-10-CM

## 2019-07-08 DIAGNOSIS — Z88.1: ICD-10-CM

## 2019-07-08 DIAGNOSIS — Z32.02: ICD-10-CM

## 2019-07-08 DIAGNOSIS — M79.662: ICD-10-CM

## 2019-07-08 DIAGNOSIS — R13.10: Primary | ICD-10-CM

## 2019-07-08 DIAGNOSIS — Z88.0: ICD-10-CM

## 2019-07-08 DIAGNOSIS — J02.9: ICD-10-CM

## 2019-07-08 DIAGNOSIS — Z79.3: ICD-10-CM

## 2019-07-08 DIAGNOSIS — Z91.040: ICD-10-CM

## 2019-07-08 DIAGNOSIS — F41.9: ICD-10-CM

## 2019-07-08 DIAGNOSIS — Z88.8: ICD-10-CM

## 2019-07-08 DIAGNOSIS — J02.9: Primary | ICD-10-CM

## 2019-07-08 LAB
ALBUMIN SERPL BCG-MCNC: 4.3 G/DL (ref 3.2–5.2)
ALBUMIN/GLOB SERPL: 1.3 {RATIO} (ref 1–3)
ALP SERPL-CCNC: 77 U/L (ref 34–104)
ALT SERPL W P-5'-P-CCNC: 16 U/L (ref 7–52)
ANION GAP SERPL CALC-SCNC: 12 MMOL/L (ref 2–11)
AST SERPL-CCNC: 12 U/L (ref 13–39)
BASOPHILS # BLD AUTO: 0.1 10^3/UL (ref 0–0.2)
BUN SERPL-MCNC: 8 MG/DL (ref 6–24)
BUN/CREAT SERPL: 11.1 (ref 8–20)
CALCIUM SERPL-MCNC: 10.2 MG/DL (ref 8.6–10.3)
CHLORIDE SERPL-SCNC: 103 MMOL/L (ref 101–111)
EOSINOPHIL # BLD AUTO: 0 10^3/UL (ref 0–0.6)
GLOBULIN SER CALC-MCNC: 3.3 G/DL (ref 2–4)
GLUCOSE SERPL-MCNC: 124 MG/DL (ref 70–100)
HCO3 SERPL-SCNC: 19 MMOL/L (ref 22–32)
HCT VFR BLD AUTO: 35 % (ref 35–47)
HGB BLD-MCNC: 12.4 G/DL (ref 12–16)
INR PPP/BLD: 1.3 (ref 0.82–1.09)
LYMPHOCYTES # BLD AUTO: 1.6 10^3/UL (ref 1–4.8)
MCH RBC QN AUTO: 30 PG (ref 27–31)
MCHC RBC AUTO-ENTMCNC: 35 G/DL (ref 31–36)
MCV RBC AUTO: 86 FL (ref 80–97)
MONOCYTES # BLD AUTO: 1.3 10^3/UL (ref 0–0.8)
NEUTROPHILS # BLD AUTO: 7.8 10^3/UL (ref 1.5–7.7)
NRBC # BLD AUTO: 0 10^3/UL
NRBC BLD QL AUTO: 0
PLATELET # BLD AUTO: 343 10^3/UL (ref 150–450)
POTASSIUM SERPL-SCNC: 3.5 MMOL/L (ref 3.5–5)
PROT SERPL-MCNC: 7.6 G/DL (ref 6.4–8.9)
RBC # BLD AUTO: 4.12 10^6 /UL (ref 3.7–4.87)
RBC UR QL AUTO: (no result)
SODIUM SERPL-SCNC: 134 MMOL/L (ref 135–145)
WBC # BLD AUTO: 10.8 10^3/UL (ref 3.5–10.8)
WBC UR QL AUTO: (no result)

## 2019-07-08 PROCEDURE — 71046 X-RAY EXAM CHEST 2 VIEWS: CPT

## 2019-07-08 PROCEDURE — 96361 HYDRATE IV INFUSION ADD-ON: CPT

## 2019-07-08 PROCEDURE — 85610 PROTHROMBIN TIME: CPT

## 2019-07-08 PROCEDURE — 86140 C-REACTIVE PROTEIN: CPT

## 2019-07-08 PROCEDURE — 81003 URINALYSIS AUTO W/O SCOPE: CPT

## 2019-07-08 PROCEDURE — 87040 BLOOD CULTURE FOR BACTERIA: CPT

## 2019-07-08 PROCEDURE — 96365 THER/PROPH/DIAG IV INF INIT: CPT

## 2019-07-08 PROCEDURE — 85025 COMPLETE CBC W/AUTO DIFF WBC: CPT

## 2019-07-08 PROCEDURE — 84702 CHORIONIC GONADOTROPIN TEST: CPT

## 2019-07-08 PROCEDURE — 36415 COLL VENOUS BLD VENIPUNCTURE: CPT

## 2019-07-08 PROCEDURE — 99283 EMERGENCY DEPT VISIT LOW MDM: CPT

## 2019-07-08 PROCEDURE — G0463 HOSPITAL OUTPT CLINIC VISIT: HCPCS

## 2019-07-08 PROCEDURE — 87651 STREP A DNA AMP PROBE: CPT

## 2019-07-08 PROCEDURE — 96375 TX/PRO/DX INJ NEW DRUG ADDON: CPT

## 2019-07-08 PROCEDURE — 70491 CT SOFT TISSUE NECK W/DYE: CPT

## 2019-07-08 PROCEDURE — 81015 MICROSCOPIC EXAM OF URINE: CPT

## 2019-07-08 PROCEDURE — 83605 ASSAY OF LACTIC ACID: CPT

## 2019-07-08 PROCEDURE — 87086 URINE CULTURE/COLONY COUNT: CPT

## 2019-07-08 PROCEDURE — 99212 OFFICE O/P EST SF 10 MIN: CPT

## 2019-07-08 PROCEDURE — 80053 COMPREHEN METABOLIC PANEL: CPT

## 2019-07-08 NOTE — UC
Throat Pain/Nasal Chris HPI





- HPI Summary


HPI Summary: 





35 yo female presents with swelling. She begins the conversation by telling me 

that she has been dealing with health issues for the last 10 years and does not 

want to go to the local ER because she has had bad experiences there and feels 

that the staff does not take her seriously. About a month ago she developed a 

sore throat. After about a week her sore throat improved, but then she began 

having "swollen bronchi". After about a week of this it improved, but then 

started noticing swollen lymph nodes around her throat. These have persisted 

for the past 2 weeks. She states that it is difficult to eat foods and has to 

force herself to drink liquids. She feels that she is going to choke. She has 

not eaten due to pain/choking. She cannot take any OTC medications.  Last night 

she was in a hot shower for 35 minutes and the heat improved her swollen lymph 

nodes for about 15 minutes before returning. 





She also notes that she switched OBC about a month ago. Over the last 2 days 

has been having pain in her left calf during ambulation and states that she 

thinks her leg is swollen. 





She denies fever, SOB, chest pain, abdominal pain, vomiting, dysuria. 








- History of Current Complaint


Stated Complaint: THROAT PAIN


Time Seen by Provider: 07/08/19 14:24


Hx Obtained From: Patient


Hx Last Menstrual Period: 4/27/2018


Onset/Duration: Gradual Onset


Severity: Moderate


Pain Intensity: 4


Pain Scale Used: 0-10 Numeric





- Allergies/Home Medications


Allergies/Adverse Reactions: 


 Allergies











Allergy/AdvReac Type Severity Reaction Status Date / Time


 


bupropion [From Wellbutrin] Allergy  See Comment Verified 07/08/19 18:45


 


carisoprodol [From Soma] Allergy  Vertigo Verified 07/08/19 18:45


 


ciprofloxacin Allergy  Hives Verified 07/08/19 18:45


 


clindamycin Allergy  Hives Verified 07/08/19 18:45


 


diphenhydramine Allergy  See Comment Verified 07/08/19 18:45





[From Benadryl]     


 


latex Allergy  Rash Verified 07/08/19 18:45


 


Penicillins Allergy  Nausea Verified 07/08/19 18:45


 


SSRI Allergy  Unknown Uncoded 07/08/19 18:45





   Reaction  





   Details  











Home Medications: 


 Home Medications





ALPRAZolam TAB* [Xanax TAB*] 0.5 mg PO BID PRN 07/08/19 [History Confirmed 07/08 /19]


Estradiol Valerate/Dienogest [Natazia] 1 tab PO DAILY 07/08/19 [History 

Confirmed 07/08/19]


Fluticasone/Vilanterol MDI(NF) [Breo Ellipta MDI (NF)] 1 puff INH DAILY 07/08/ 19 [History Confirmed 07/08/19]











PMH/Surg Hx/FS Hx/Imm Hx





- Additional Past Medical History


Additional PMH: 





PCOS


Seizure like activity


Psychological History: Anxiety, Depression


Other History Of: 


   Negative For: Anticoagulant Therapy





- Surgical History


Surgical History: None


Surgery Procedure, Year, and Place: Cyst removal back of head 2008, Gallbladder 

removal, ganglion cyst removed from left wrist, tonsillectomy 1989.





- Family History


Known Family History: Positive: Cardiac Disease





- Social History


Alcohol Use: None


Substance Use Type: None


Substance Use Comment - Amount & Last Used: one inhalation 2 weeks ago


Smoking Status (MU): Never Smoked Tobacco





Review of Systems


All Other Systems Reviewed And Are Negative: Yes


Constitutional: Positive: Negative


Skin: Positive: Negative


Eyes: Positive: Negative


ENT: Positive: Sore Throat


Respiratory: Positive: Cough


Cardiovascular: Positive: Negative


Gastrointestinal: Positive: Negative


Genitourinary: Positive: Negative


Motor: Positive: Negative


Neurovascular: Positive: Negative


Musculoskeletal: Positive: Other: - left calf pain and swelling


Neurological: Positive: Negative


Psychological: Positive: Negative





Physical Exam





- Summary


Physical Exam Summary: 





GENERAL: NAD. WDWN. No pain distress.


SKIN: No rashes, sores, lesions, or open wounds.


HEENT:


            Head: AT/NC


            Eyes: EOM intact. Conjunctiva clear without inflammation or 

discharge.


            Ears: Hearing grossly normal. TMs intact, no bulging, erythema, or 

edema. 


            Nose: Nasal mucosa pink and moist. NTTP maxillary and frontal 

sinus. 


            Throat: Posterior oropharynx without exudates, erythema, or 

tonsillar enlargement.  Uvula midline. Airway patent and without edema 


NECK: Supple. Mild TTP with ~1.0cm tonsillar lymph node on left. Shotty 

submental LAD TTP. 


CHEST: Wheezing right lung. No accessory muscle use. Breathing comfortably and 

in no distress.


CV:  RRR. Without m/r/g.  Pulses intact PT and DP. Cap refill <2seconds


MSK: LEFT CALF: NTTP. No edema or palpable masses. Negative Jacobo's sign.


NEURO: Alert. 


PSYCH: Age appropriate behavior.


Triage Information Reviewed: Yes


Vital Signs: 





Vital Signs:











Temp Pulse Resp BP Pulse Ox


 


 97.9 F   107   16   160/98   99 


 


 07/08/19 14:30  07/08/19 14:30  07/08/19 14:30  07/08/19 14:30  07/08/19 14:30








 Laboratory Tests











  07/08/19 07/08/19 07/08/19





  14:47 14:48 14:52


 


POC Urine Color  Darcie  


 


POC Urine Clarity  Cloudy  


 


POC Urine pH  6.0  


 


POC Ur Specif Gravity  >= 1.030  


 


POC Urine Protein  2+ A  


 


POC Ur Glucose (UA)  Negative  


 


POC Urine Ketones  4+ A  


 


POC Urine Blood  3+ A  


 


POC Urine Nitrite  Negative  


 


POC Urine Bilirubin  3+ A  


 


POC Urine Urobilinogen  0.2  


 


POC U Leukocyte Esteras  Negative  


 


POC Ur Pregnancy Test   Negative 


 


Group A Strep Rapid    Negative











Vital Signs Reviewed: Yes





Throat Pain/Nasal Course/Dx





- Course


Course Of Treatment: 





She does have some LAD/swelling in her anterior neck. I discussed with her the 

need for labwork and likely CT scan of the area with contrast to most 

appropriately evaluate her condition. I recommended that she go to the ER for 

further evaluation, but she declined and wishes to proceed with whatever work-

up I can provide for her here in the Urgent Care.





I believe the patient is clinically sober, free from distracting injury, 

appears to have insight and reasoning and, in my judgment, has capacity to make 

decisions.


I have explained that I am concerned this may represent a soft tissue neck 

abscess or LAD causing dysphagia, she verbalized understanding. I have 

discussed the need to get a CT and labwork to obtain more information about the 

cause of her symptoms. I have told the patient if they do not seek eval/

treatment in the ED their condition could get much worse, could become 

critically ill and suffer disability and possibly death. She continued to 

decline ER eval and wishes to be undergo the best workup I can provide for her 

in the Urgent Care.





Will order UA, urine pregnancy, CXR, and US of the left leg for DVT.





UA without sign of infection





Urine pregnancy negative





CXR: IMPRESSION: #. No evidence for pneumonia. Negative exam.





US:


IMPRESSION: NO EVIDENCE OF DEEP VENOUS THROMBOSIS IS IDENTIFIED.





I had a long discussion with pt and her family member with her today that all 

of her testing here has been negative/normal. The LAD in her neck could be due 

to a viral illness, but given her recent onset of dysphagia and "choking" - I 

cannot rule out an underlying soft tissue abscess or throat nodule. Otherwise 

she is well appearing, afebrile, and airway is patent without evidence of edema 

or obstruction. I discussed with them going to the ER for further evaluation 

and they were agreeable to this at this time. 














- Differential Dx/Diagnosis


Provider Diagnosis: 


 Dysphagia, Head and neck lymphadenopathy, Sore throat








Discharge





- Sign-Out/Discharge


Documenting (check all that apply): Patient Departure


All imaging exams completed and their final reports reviewed: Yes





- Discharge Plan


Condition: Stable


Disposition: HOME-RECOMMEND TO ED


Referrals: 


Wes Mac DO [Primary Care Provider] - 


Additional Instructions: 


I recommend that you go to the ER for further evaluation of your inability to 

swallow without choking, swelling in your neck, and throat pain.





The provider that examined you today is concerned that you may have a soft 

tissue neck abscess or underlying nodule that is causing your dysphagia, which 

we cannot appropriately evaluate in the urgent care. 





- Billing Disposition and Condition


Condition: STABLE


Disposition: Home-Recommend to ED





- Attestation Statements


Provider Attestation: 





Per institutional requirements, I have reviewed the chart, however, I was not 

consulted specifically or made aware of this patient by the  midlevel provider.

  I did not personally evaluate, interact with , or disposition  this patient.

## 2019-07-08 NOTE — XMS REPORT
Continuity of Care Document (C-CDA R2.1) (Encounter date: 2019 07:20 AM)

 Created on:2019



Patient:MOUNA

Sex:Female

:1985

External Reference #:[id number]





Demographics







 Address  [address]



   [address]



   [place] [address]

 

 Work Phone  [phone]

 

 Mobile Phone  [phone]

 

 Home Phone  [phone]

 

 Email Address  [email or web address]

 

 Preferred Language  und

 

 Marital Status  Not  or 

 

 Latter-day Affiliation  Unknown

 

 Race  Unknown

 

 Additional Race(s)  Other Race

 

 Ethnic Group  Not  or 









Author







 Organization  [facility]

 

 Address  [address] [place] [id number]

 

 Phone  [phone]









Support







 Name  Relationship  Address  Phone

 

 GENE FREIRE  Unavailable  Unavailable  [phone]









Care Team Providers







 Name  Role  Phone

 

 Calli Cohen NP  Unavailable  Unavailable









Allergies, Adverse Reactions, Alerts







 Substance  Reaction  Status

 

 BUPROPION HCL    Active

 

 clindamycin    Active

 

 Penicillins   Nausea/Vomiting  Active

 

 ciprofloxacin    Active

 

 latex   Hives/Skin Rash  Active







Medications







 Medication  Instructions  Dosage  Effective Dates  Status  Comments



       (start - stop)    

 

 Natazia 3 mg/2 mg-2  take 1 tablet by  1.00 tablet   -  Active  



 mg/2 mg-3 mg/1 mg  oral route  every        



 tablet  day for 28 days        

 

 estradiol 1 mg tablet  take 1 tablet by  1 MG  May- -  Active  



   oral route 2 times        



   every day        

 

 Depo-Provera 150  IM Q 11-13 weeks     -  Active  



 mg/mL intramuscular          



 suspension          

 

 ALBUTEROL INHALER    Not Available   -  Active  



 (unknown strength)          

 

 ALPRAZOLAM (unknown    Not Available   -  Active  



 strength)          

 

 BREO ELLIPTA (unknown    Not Available   -  Active  



 strength)          

 

 SPIRONOLACTONE    Not Available   -  Active  



 (unknown strength)          







Problems







 Condition  Effective Dates (start -  Clinical Status  Comments



   stop)    

 

 Excessive and frequent      



 menstruation with irregular cycle      

 

 Abnormal uterine and vaginal      



 bleeding, unspecified      

 

 Encounter for oth general cnsl and  May- -    



 advice on contraception      

 

 Encounter for surveillance of      



 injectable contraceptive      

 

 Encntr screen for infections w      



 sexl mode of transmiss      

 

 Encntr for general adult medical      



 exam w/o abnormal findings      

 

 Human immunodeficiency virus [HIV]   -    



 counseling      

 

 Encounter for screening for human   -    



 immunodeficiency virus      

 

 Encounter for initial prescription      



 of injectable contracep      

 

 Encntr screen for infections w      



 sexl mode of transmiss      

 

 Encounter for oth general cnsl and   -    



 advice on contraception      

 

 Human immunodeficiency virus [HIV]   -    



 counseling      

 

 Encounter for screening for human   -    



 immunodeficiency virus      

 

 Encntr screen for infections w      



 sexl mode of transmiss      

 

 Encounter for oth general cnsl and      



 advice on contraception      

 

 Human immunodeficiency virus [HIV]   -    



 counseling      

 

 Encounter for pregnancy test,      



 result negative      

 

 Encntr screen for infections w      



 sexl mode of transmiss      

 

 Encounter for oth general cnsl and      



 advice on contraception      

 

 Encounter for screening for human   -    



 immunodeficiency virus      

 

 Polycystic ovarian syndrome      

 

 Personal history of oth diseases      



 of the female genital tract      

 

 LABORATORY EXAM NOS      







Procedures







 Procedure  Date









 No information







Results







 Test Name  Date and Time  Measure  Units  Reference Range  Abnormal Flag  
Status  Comments









 No information







Advance Directives







 Directive  Yes / No  Effective Date  File Name









 No information







Encounters







 Encounter  Practice  Location  Reason(s)  Diagnoses  Date  Provider  Providers



 Description      For Visit        Copied on



               Encounter

 

   [facility]  [facility]    Excessive and  Jay Jay-1  Parete  



   [facility]  [place]    frequent  [address]. [address]  



   [facility]      menstruation  [address],  



   Finger      with irregular    [place], [place],  



   [facility], [facility]      cycle    [address].  



   [address]          tel:[phone]  



   [address], [place],          [address]  



   [place],            



   [id number],            



   [place]            



   tel:[phone]            

 

   [facility]  [facility]    Abnormal uterine  Jay Jay-1  Raphaelidis  Referring



   [facility]  [place]    and vaginal  2  [address]. [address]  Provider:



   [facility]      bleeding,  [address],  [place]



   Finger      unspecified    [place], [place],  [facility]



   [facility], [facility]          [address].  s, [address]



   [address]          tel:[phone]  [address],



   St, [place],          [address]  [place],



   [place], [address].



   [id number],            tel:[phone]



   [place]            [id number]



   tel:[phone]            



   [phone]            

 

   [facility]  [facility]      Jay Jay-0  Raphaelidis  



   [facility]  [place]      4-  [address]  



   [facility]        [address],  



   Finger          [place], [place],  



   [facility], [facility]          [address].  



   [address]          tel:[phone]  



   St, [place],          [address]  



   [place],            



   [id number],            



   [place]            



   tel:[phone]            

 

   [facility]  [facility]      May-2  Raphaelidis  



   [facility]  [place]      4[address]201  Anais. [address]  



   [facility]        [address],  



   Finger          [place], [place],  



   [facility], [facility]          [address].  



   [address]          tel:[phone]  



   St, [place],          [address]  



   [place],            



   [id number],            



   [place]            



   tel:[phone]            



   [phone]            

 

   [facility]  [facility]    Encounter for  May-1  Raphaelidis  Referring



   [facility]  [place]    ot general cnsl  0[address]201  [address]  Provider:



   [facility]      and advice on  [address],  [place]ais



   Finger      contraception    [place], [place],  Raphmadalynidi



   [facility], [address]          [address].  s, [address]



   [address]          tel:[phone]  [address],



   St, [place],          [address]  [place], [address].



   [id number],            tel:[phone]



   [place]            [id number]



   tel:[phone]            



   [phone]            

 

   [facility]  [facility]    Encounter for  Mar-2  Annia  Referring



   [facility]  [place]    surveillance of  2[address]  [place]  Provider:



   [facility]      injectable  [address],  [place]



   Finger      contraceptive    [place], [place],  [place], [address]          [address], [place].  [address]



   [address]          tel:[phone]  [address],



   St, [place],          [address]  [place], [address].



   [id number],            tel:[phone]



   [place]            [id number]



   tel:[phone]            [facility]



   [id number]            Provider:



               NURSE OR



               [place] [facility].

 

   [facility]  [facility]    Encntr screen    Annia  Referring



   [facility]  [place]    for infections w  [address]  [place]. [address]  Provider:



   [facility]      sexl mode of  [address],  [place]



   Finger      transmissEncntr    [place], [place],  [place], [address]      for general    [address], [place].  [address]



   [address]      adult medical    tel:[phone]  [address],



   St, [place],      exam w/o    [address]  [place],



   [place]      abnormal      [place], [address].



   [id number],      findings      tel:[phone]



   [place]            [id number]



   tel:[phone]            sulting



   532414            Provider:



               NURSE OR



               [facility].

 

   [facility]  [facility]    Human    Raphaelidis  Referring



   [facility]  [place]    immunodeficiency  6[address]201  [address]. [address]  Provider:



   [facility]      virus [HIV]  [address],  [place]



   Finger      counselingEncoun    [place], [place],  [place], [address]      ter for    [address].  [address]



   [address]      screening for    tel:[phone]  [address],



   St, [place],      human    [address]  [place],



   [place], [address].



   [id number],      virusEncounter      tel:[phone]



   [place]      for initial      [id number]



   tel:[phone]      prescription of      



   [address]      injectable      



         contracepEncntr      



         screen for      



         infections w      



         sexl mode of      



         transmissEncount      



         er for oth      



         general cnsl and      



         advice on      



         contraception      

 

   [facility]  [facility]    Human  Jay Jay-1  Kornblum  Referring



   [facility]  [place]    immunodeficiency  8[address]201  Linda. [address]  Provider:



   [facility]      virus [HIV]  [address],  [place]



   Finger      counselingEncoun    [place], [place],  [place], [address]      ter for    [address].  [address]



   [address]      screening for    tel:[phone]  [address],



   St, [place],      human    [address]  [place],



   [place]eficiency      [place], [address].



   [id number],      virusEncntr      tel:[phone]



   [place]      screen for      [id number]



   tel:[phone]      infections w      



   004302      sexl mode of      



         transmissEncount      



         er for oth      



         general cnsl and      



         advice on      



         contraception      

 

   [facility]  [facility]    Human  Jay Jay-2  Borglum  Referring



   [facility]  [place]    immunodeficiency  8-201  [place]ile[place]. [address]  Provider:



   [facility]      virus [HIV]  [address],  [place]



   Finger      counselingEncoun    [place], [place],  [place], [address]      ter for    [address], [place].  [address]



   [address]      pregnancy test,    tel:[phone]  [address],



   St, [place],      result    [address]  [place],



   [place]      negativeEncntr      [place], [address].



   [id number],      screen for      tel:[phone]



   [place]      infections w      [id number]



   tel:[phone]      sexl mode of      



   [address]      transmissEncount      



         er for oth      



         general cnsl and      



         advice on      



         contraceptionEnc      



         ounter for      



         screening for      



         human      



         immunodeficiency      



         virusPolycystic      



         ovarian      



         syndromePersonal      



         history of oth      



         diseases of the      



         female genital      



         tract      

 

   [facility]  [facility]    LABORATORY EXAM    [place]  



   [facility]  [place]    NOS  5[address]  Lu[address]  



   [facility]        [address],  



   Finger          [place], [place],  



   [place], [address]          [address].  



   [address]          tel:[phone]  



   St, [place],          [address]  



   [place],            



   [id number],            



   [place]            



   tel:[phone]            







Family History







 Family Member  Diagnosis  Age At Onset

 

 Mother  High cholesterol  

 

 Brother  Depression  

 

 1st degree relative  No hx of venous thromboembolism  

 

 1st degree relative  No hx of cancer of breast, colon, endometrium or  



   ovary  

 

 1st degree relative  No hx of coronary heart disease (female <65, male  



   <55)  

 

 Father  Multiple sclerosis  

 

 Maternal grandmother  Cancer, ovarian  







Immunizations







 Vaccine  Date  Status  Comments









 No information







Payers







 Payer name  Insurance type  Covered party ID  Authorization(s)

 

 Medicare  MB  [id number]  

 

 Medicaid [employer]  [id number]  







Social History







 Type  Description  Quantity  Date Captured  Comments

 

 Alcohol Use Details  Unknown      

 

 Caffeine Use Details  Unknown      

 

 Tobacco Use Status  Unknown      

 

 Smoking Status  Never smoker      

 

 Birth Sex  Female      







Vital Signs







 Date /  Height  Weight  BMI  Pulse  Blood  Temperature  Respiratory  Body  
Head  BMI  Pulse  Inhaled



 Time:        Rate  Pressure    Rate  Surface  Circumference  percentile  Ox  Ox



                 Area        









 No information







Chief Complaint And Reason For Visit

No information



Reason For Referral







 Reason For Referral

 

 No information







Plan Of Treatment







 Date  Type  Action  Status

 

   Referral   Ordered:  ordered



     Referrals: Gynecologist. Consult  







History Of Present Illness







 Encounter Date  Complaint  History Of Present Illness









 No information







Functional Status







 Date  Functional Assessment









 No information







Medications Administered







 Medication  Instructions  Dosage  Effective Dates (start - stop)  Status  
Comments









 No information







Instructions







 Date  Instruction  Additional Information









 No information







Assessments







 Type  Assessment  Date

 

 assessment  Excessive and frequent menstruation with irregular cycle  2019







Goals







 Health Concern  Goal  Type  Priority  Status  Date









 No information







Medical Equipment







 Description  Device  Universal Device Identifier  Effective Dates (start - stop
)  Status









 No information







Mental Status







 Date  Cognitive Assessment









 No information







Health Concerns







 Observation  Date









 No information









 Concern  Status  Date









 No information

## 2019-07-08 NOTE — XMS REPORT
Continuity of Care Document (C-CDA R2.1) (Encounter date: 2019 09:42 AM)

 Created on:2019



Patient:MOUNA

Sex:Female

:1985

External Reference #:[id number]





Demographics







 Address  [address]



   [address]



   [place] [address]

 

 Work Phone  [phone]

 

 Mobile Phone  [phone]

 

 Home Phone  [phone]

 

 Email Address  [email or web address]

 

 Preferred Language  und

 

 Marital Status  Not  or 

 

 Sikhism Affiliation  Unknown

 

 Race  Unknown

 

 Additional Race(s)  Other Race

 

 Ethnic Group  Not  or 









Author







 Organization  [facility]

 

 Address  [address] [place] [address]

 

 Phone  [phone]









Support







 Name  Relationship  Address  Phone

 

 GENE FREIRE  Unavailable  Unavailable  [phone]









Care Team Providers







 Name  Role  Phone

 

 Calli Cohen NP  Unavailable  Unavailable









Allergies, Adverse Reactions, Alerts







 Substance  Reaction  Status

 

 BUPROPION HCL    Active

 

 clindamycin    Active

 

 Penicillins   Nausea/Vomiting  Active

 

 ciprofloxacin    Active

 

 latex   Hives/Skin Rash  Active







Medications







 Medication  Instructions  Dosage  Effective Dates  Status  Comments



       (start - stop)    

 

 Natazia 3 mg/2 mg-2  take 1 tablet by  1.00 tablet   -  Active  



 mg/2 mg-3 mg/1 mg  oral route  every        



 tablet  day for 28 days        

 

 estradiol 1 mg tablet  take 1 tablet by  1 MG  May- -  Active  



   oral route 2 times        



   every day        

 

 Depo-Provera 150  IM Q 11-13 weeks     -  Active  



 mg/mL intramuscular          



 suspension          

 

 ALBUTEROL INHALER    Not Available   -  Active  



 (unknown strength)          

 

 ALPRAZOLAM (unknown    Not Available   -  Active  



 strength)          

 

 BREO ELLIPTA (unknown    Not Available   -  Active  



 strength)          

 

 SPIRONOLACTONE    Not Available   -  Active  



 (unknown strength)          







Problems







 Condition  Effective Dates (start -  Clinical Status  Comments



   stop)    

 

 Abnormal uterine and vaginal      



 bleeding, unspecified      

 

 Encounter for oth general cnsl and  May- -    



 advice on contraception      

 

 Encounter for surveillance of      



 injectable contraceptive      

 

 Encntr screen for infections w      



 sexl mode of transmiss      

 

 Encntr for general adult medical      



 exam w/o abnormal findings      

 

 Human immunodeficiency virus [HIV]   -    



 counseling      

 

 Encounter for screening for human   -    



 immunodeficiency virus      

 

 Encounter for initial prescription      



 of injectable contracep      

 

 Encntr screen for infections w      



 sexl mode of transmiss      

 

 Encounter for oth general cnsl and   -    



 advice on contraception      

 

 Human immunodeficiency virus [HIV]   -    



 counseling      

 

 Encounter for screening for human   -    



 immunodeficiency virus      

 

 Encntr screen for infections w      



 sexl mode of transmiss      

 

 Encounter for oth general cnsl and      



 advice on contraception      

 

 Human immunodeficiency virus [HIV]   -    



 counseling      

 

 Encounter for pregnancy test,      



 result negative      

 

 Encntr screen for infections w      



 sexl mode of transmiss      

 

 Encounter for oth general cnsl and      



 advice on contraception      

 

 Encounter for screening for human   -    



 immunodeficiency virus      

 

 Polycystic ovarian syndrome      

 

 Personal history of oth diseases      



 of the female genital tract      

 

 LABORATORY EXAM NOS      







Procedures







 Procedure  Date

 

 TISSUE EXAM BY PATHOLOGIST Level IV  







Results







 Test Name  Date and Time  Measure  Units  Reference Range  Abnormal Flag  
Status  Comments









 No information







Advance Directives







 Directive  Yes / No  Effective Date  File Name









 No information







Encounters







 Encounter  Practice  Location  Reason(s)  Diagnoses  Date  Provider  Providers



 Description      For Visit        Copied on



               Encounter

 

   [facility]    Abnormal uterine  -  Parete  



   [facility]  [place]    and vaginal  2  [address]i. [address]  



   [facility]      bleeding,  [address],  



   [place]      unspecified    [place], [place],  



   [facility], [facility]          [id number].  



   [address]          tel:[phone]  



   [address], [place],          [address]  



   [place],            



   [id number],            



   [place]            



   tel:[phone]            

 

   [facility]  [facility]      Jay Jay-0  Raphkyler  



   [facility]  [place]      [address]  



   [facility]        [address],  



   F[address]          [place], [place],  



   [facility], [facility]          [address].  



   [address]          tel:[phone]  



   [address], [place],          [address]  



   [place],            



   [id number],            



   [place]            



   tel:[phone]            

 

   [facility]  [facility]      May-2  Raphaelidis  



   [facility]  [place]      [address]  [address]  



   [facility]        [address],  



   Finger          [place], [place],  



   [facility], [facility]          [address].  



   [address]          tel:[phone]  



   [address], [place],          [address]  



   [place],            



   [id number],            



   [place]            



   tel:[phone]            

 

   [facility]  [facility]    Encounter for  May-  Raphmadalynidis  Referring



   [facility]  [place]    ot general cnsl  [address]  [address]  Provider:



   [facility]      and advice on  [address],  [place]



   [place]      contraception    [place], [place],  [place]



   [facility], [facility]          [address].  s, [address]



   [address]          tel:[phone]  [address],



   [address], [place],          [address]  [place],



   [place], [address].



   [id number],            tel:[phone]



   [place]            [id number]



   tel:[phone]            



   [phone]            

 

   [facility]  [facility]    Encounter for  Mar-2  Annia  Referring



   [facility]  [place]    surveillance of  [address]  [address]  Provider:



   [facility]      injectable  [address],  [place]



   [place]      contraceptive    [place], [place],  [place], [address]          [address], [place].  [address]



   [address]          tel:[phone]  [address],



   [address], [place],          [address]  [place], [address].



   [id number],            tel:[phone]



   [place]            [id number]



   tel:[phone]            sulting



   [id number]            Provider:



               NURSE OR



               [facility].

 

   [facility]  [facility]    Encntr screen    Annia  Referring



   [facility]  [place]    for infections w  [address]  [address]  Provider:



   [facility]      sexl mode of  [address],  [place]



   [place]      transmissEncntr    [place], [place],  [place], [address]      for general    [address], [place].  [address]



   [address]      adult medical    tel:[phone]  [address],



   [address], [place],      exam w/o    [address]  [place],



   [place]      abnormal      [place], [address].



   [id number],      findings      tel:[phone]



   [place]            [id number]



   tel:[phone]            sulting



   [id number]            Provider:



               NURSE OR



               [facility].

 

   [facility]    Human    Dane  Referring



   [facility]  [place]    immunodeficiency  [address]  [address]  Provider:



   [facility]      virus [HIV]  [address],  [place]



   [place]      counselingEncoun    [place],  [place], [address]      ter for    [address].  [address]



   [address]      screening for    tel:[phone]  [address],



   [address], [place],      human    [address]  [place],



   [place]odeficiency      [place], [address].



   [id number],      virusEncounter      tel:[phone]



   [place]      for initial      [id number]



   tel:[phone]      prescription of      



   [id number]      injectable      



         contracepEncntr      



         screen for      



         infections w      



         sexl mode of      



         transmissEncount      



         er for oth      



         general cnsl and      



         advice on      



         contraception      

 

   [facility]  [facility]    Human    Kornblum  Referring



   [facility]  [place]    immunodeficiency  [address]  [address]inda. [address]  Provider:



   [facility]      virus [HIV]  [address],  [place]



   [place]      counselingEncoun    [place], [place],  [place], [address]      ter for    [address].  [address]



   [address]      screening for    tel:[phone]  [address],



   [address], [place],      human    [address]  [place],



   [place], [address].



   [id number],      virusEncntr      tel:[phone]



   [place]      screen for      [id number]



   tel:[phone]      infections w      



   [id number]      sexl mode of      



         transmissEncount      



         er for oth      



         general cnsl and      



         advice on      



         contraception      

 

   [facility]  [facility]    Human  Jay Jay-2  Borglum  Referring



   [facility]  [place]    immunodeficiency  8-  [address]ile[address]. [address]  Provider:



   [facility]      virus [HIV]  [address],  [place]



   [place]      counselingEncoun    [place], [place],  [place], [address]      ter for    [address], [place].  [address]



   [address]      pregnancy test,    tel:[phone]  [address],



   [address], [place],      result    [address]  [place],



   [place]      negativeEncntr      [place], [address].



   [id number],      screen for      tel:[phone]



   [place]      infections w      [id number]



   tel:[phone]      sexl mode of      



   [id number]      transmissEncount      



         er for oth      



         general cnsl and      



         advice on      



         contraceptionEnc      



         ounter for      



         screening for      



         human      



         immunodeficiency      



         virusPolycystic      



         ovarian      



         syndromePersonal      



         history of oth      



         diseases of the      



         female genital      



         tract      

 

   [facility]  [facility]    LABORATORY EXAM    [place]  



   [facility]  [place]    NOS  [address]  [address]  



   [facility]        [address],  



   [address]          [place], [place],  



   [facility], [address]          [address].  



   [address]          tel:[phone]  



   [address], [place],          [address]  



   [place],            



   [id number],            



   [place]            



   tel:[phone]            



   [phone]            







Family History







 Family Member  Diagnosis  Age At Onset

 

 Mother  High cholesterol  

 

 Brother  Depression  

 

 1st degree relative  No hx of venous thromboembolism  

 

 1st degree relative  No hx of cancer of breast, colon, endometrium or  



   ovary  

 

 1st degree relative  No hx of coronary heart disease (female <65, male  



   <55)  

 

 Father  Multiple sclerosis  

 

 Maternal grandmother  Cancer, ovarian  







Immunizations







 Vaccine  Date  Status  Comments









 No information







Payers







 Payer name  Insurance type  Covered party ID  Authorization(s)

 

 Medicare MB  [id number]  

 

 Medicaid MC  [id number]  







Social History







 Type  Description  Quantity  Date Captured  Comments

 

 Alcohol Use Details  Unknown      

 

 Caffeine Use Details  Unknown      

 

 Tobacco Use Status  Unknown      

 

 Smoking Status  Never smoker      

 

 Birth Sex  Female      







Vital Signs







 Date /  Height  Weight  BMI  Pulse  Blood  Temperature  Respiratory  Body  
Head  BMI  Pulse  Inhaled



 Time:        Rate  Pressure    Rate  Surface  Circumference  percentile  Ox  Ox



                 Area        









 No information







Chief Complaint And Reason For Visit

No information



Reason For Referral







 Reason For Referral

 

 No information







Plan Of Treatment







 Date  Type  Action  Status









 No information







History Of Present Illness







 Encounter Date  Complaint  History Of Present Illness









 No information







Functional Status







 Date  Functional Assessment









 No information







Medications Administered







 Medication  Instructions  Dosage  Effective Dates (start - stop)  Status  
Comments









 No information







Instructions







 Date  Instruction  Additional Information









 No information







Assessments







 Type  Assessment  Date

 

 assessment  Abnormal uterine and vaginal bleeding, unspecified  







Goals







 Health Concern  Goal  Type  Priority  Status  Date









 No information







Medical Equipment







 Description  Device  Universal Device Identifier  Effective Dates (start - stop
)  Status









 No information







Mental Status







 Date  Cognitive Assessment









 No information







Health Concerns







 Observation  Date









 No information









 Concern  Status  Date









 No information

## 2019-12-16 ENCOUNTER — HOSPITAL ENCOUNTER (EMERGENCY)
Dept: HOSPITAL 25 - ED | Age: 34
Discharge: HOME | End: 2019-12-16
Payer: MEDICARE

## 2019-12-16 VITALS — DIASTOLIC BLOOD PRESSURE: 68 MMHG | SYSTOLIC BLOOD PRESSURE: 135 MMHG

## 2019-12-16 DIAGNOSIS — R11.0: ICD-10-CM

## 2019-12-16 DIAGNOSIS — Z88.1: ICD-10-CM

## 2019-12-16 DIAGNOSIS — Z88.0: ICD-10-CM

## 2019-12-16 DIAGNOSIS — Z88.8: ICD-10-CM

## 2019-12-16 DIAGNOSIS — R19.7: ICD-10-CM

## 2019-12-16 DIAGNOSIS — J18.9: Primary | ICD-10-CM

## 2019-12-16 DIAGNOSIS — Z91.040: ICD-10-CM

## 2019-12-16 DIAGNOSIS — J45.990: ICD-10-CM

## 2019-12-16 LAB
FLUAV RNA SPEC QL NAA+PROBE: NEGATIVE
FLUBV RNA SPEC QL NAA+PROBE: NEGATIVE

## 2019-12-16 PROCEDURE — 71046 X-RAY EXAM CHEST 2 VIEWS: CPT

## 2019-12-16 PROCEDURE — 99282 EMERGENCY DEPT VISIT SF MDM: CPT

## 2019-12-16 NOTE — XMS REPORT
Transition of Care

 Created on:2019



Patient:RAÚL FREIRE

Sex:Female

:1985

External Reference #:[id number]





Demographics







 Address  [address] [place] [address]

 

 Home Phone  [phone]

 

 Preferred Language  Unknown

 

 Marital Status  Unknown

 

 Zoroastrianism Affiliation  Unknown

 

 Race  White

 

 Additional Race(s)  Unknown

 

 Ethnic Group  Unknown









Author







 Organization  [facility]









Support







 Name  Relationship  Address  Phone

 

 Unavailable  Unavailable  Unavailable  Unavailable









Care Team Providers







 Name  Role  Phone

 

 Bill Dorsey  Primary Care Physician  Unavailable









Allergies, Adverse Reactions, Alerts







 Allergy  Code  CodeSystem  Reaction  Severity  Criticality  Status  Start



 Substance              Date



               



               

 

         Moderate      



               



               







Medications







 Medication  Medication  Medication  Start  Stop  Route  Dose  Status  Fill



   Code  CodeSystem  Date  Date        Instructions



                 



                 



                 

 

 Xanax  192132  RxNorm      oral  0.5 mg  completed   1 tablet



             1    twice a day



             tablet    as needed for



             twice a    30 day(s)



             day    



                 



                 

 

 alprazolam  026024  RxNorm  2019    oral  0.5 mg  active        for 30



       -27      tablet    day(s)



                 



                 



                 







Problems







 Problem Name  Code  CodeSystem  Alternate  Alternate  Start  End  Status  
Narrative



       Code  CodeSystem  Date  Date    



                 



                 



                 

 

  Recurrent  21042569  SNOMED-CT          Active  



 depressive          3-22      



 disorder,                



 current                



 episode                



 moderate                



                 

 

  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  



 ic stress          3-28      



 disorder,                



 unspecified                



                 



                 







Relevant diagnostic tests/laboratory data Narrative

No Information



Procedures







 Procedure  Code  CodeSystem  Target  Date of  Status  Service  Device  Device  
Device



 Name      Site  Procedure    Delivery  Code  Name  UID



             Location      



                   



                   

 

 Psychotherap  109267  SNOMED-CT   ()  2019  complete  Mental      



 y, 46  04        d  Health-      



 minutes with            [address],      



             [id number]      



             [id number]      



                   



                   

 

 Psychotherap  [id number]  SNOMED-CT   ()  2019  complete  Mental      



 y, 46  04        d  Health-      



 minutes with            [address],      



             [id number]      



             [id number]      



                   



                   

 

 Psychotherap  [id number]  SNOMED-CT   ()  2019  complete  Mental      



 y, 46  04        d  Health-      



 minutes with            [address],      



             [id number]      



             [id number]      



                   



                   

 

 Psychotherap  [id number]  SNOMED-CT   ()  2019  complete  Mental      



 y, 46  04        d  Health-      



 minutes with            [address],      



             [id number]      



             [id number]      



                   



                   

 

 Psychotherap  [id number]  SNOMED-CT   ()  2019  complete  Mental      



 y, 46  04        d  Health-      



 minutes with            [address],      



             [id number]      



             [id number]      



                   



                   

 

 Psychotherap  [id number]  SNOMED-CT   ()  2019  complete  Mental      



 y, 46  04        d  Health-      



 minutes with            [address],      



             [id number]      



             [id number]      



                   



                   

 

 Psychotherap  [id number]  SNOMED-CT   ()  2019  complete  Mental      



 y, 46  04        d  Health-      



 minutes with            [address],      



             [id number]      



             [id number]      



                   



                   

 

 Psychotherap  [id number]  SNOMED-CT   ()  2019  complete  Mental      



 y, 46  04        d  Health-      



 minutes with            [address],      



             [id number]      



             [id number]      



                   



                   

 

 Psychotherap  [id number]  SNOMED-CT   ()  2019-10-22  complete  Mental      



 y, 45  04        d  Health-      



 minutes with            [address],      



             [id number]      



             [id number]      



                   



                   

 

 Psychotherap  861920  SNOMED-CT   ()  2019  complete  Mental      



 y, 45  04        d  Health-      



 minutes with            [address],      



             [id number]      



             [id number]      



                   



                   

 

 Psychotherap  [id number]  SNOMED-CT   ()  2019-10-31  complete  Mental      



 y, 45  04        d  Health-      



 minutes with            [address],      



             [id number]      



             [id number]      



                   



                   

 

 Psychotherap  [id number]  SNOMED-CT   ()  2019  complete  Mental      



 y, 45  04        d  Health-      



 minutes with            [address],      



             [id number]      



             [id number]      



                   



                   

 

 Psychotherap  [id number]  SNOMED-CT   ()  2019  complete  Mental      



 y, 45  04        d  Health-      



 minutes with            [address],      



             [id number]      



             [id number]      



                   



                   

 

 Psychotherap  [id number]  SNOMED-CT   ()  2019  complete  Mental      



 y, 45  04        d  Health-      



 minutes with            [address],      



             [place],      



             [id number]      



             [id number]      



                   



                   

 

 Psychotherap  [id number]  SNOMED-CT   ()  2019  complete  Mental      



 y, 45  04        d  Health-      



 minutes with            [place]      



 patient            [address],      



             [id number]      



             [id number]      



                   



                   

 

 Psychotherap  [id number]  SNOMED-CT   ()  2019  complete  Mental      



 y, 45  04        d  Health-      



 minutes with            [place]      



 patient            [address],      



             [id number]      



             [id number]      



                   



                   

 

 Psychotherap  [id number]  SNOMED-CT   ()  2019  complete  Mental      



 y, 45  04        d  Health-      



 minutes with            [place]      



 patient            [address],      



             [id number]      



             [id number]      



                   



                   

 

 Office or  [id number]  SNOMED-CT   ()  2019  complete  Mental      



 other  7        d  Health-      



 outpatient            [place]      



 visit for            [address],      



 established            [id number]      



 patient,            [id number]      



 which                  



 requires at                  



 least 2 of                  



 these 3 key                  



 components:                  



 An expanded                  



 problem                  



 focused                  



 history; An                  



 expanded                  



 problem                  



 focused                  



 examination;                  



 Medical                  



 decision                  



 making of                  



 low                  



                   

 

 Office or  [id number]  SNOMED-CT   ()  2019  complete  Mental      



 other  6        d  Health-      



 outpatient            [place]      



 visit for            [address],      



 established            [id number]      



 patient,            [id number]      



 which                  



 requires at                  



 least 2 of                  



 these 3 key                  



 components:                  



 A problem                  



 focused                  



 history; A                  



 problem                  



 focused                  



 examination;                  



 Straightforw                  



 rae medical                  



 decision                  



 making.                  



 Counselin                  



                   



                   

 

 Office or  [id number]  SNOMED-CT   ()  2019  complete  Mental      



 other  6        d  Health-      



 outpatient            [place]      



 visit for            [address],      



 established            [id number]      



 patient,            [id number]      



 which                  



 requires at                  



 least 2 of                  



 these 3 key                  



 components:                  



 A problem                  



 focused                  



 history; A                  



 problem                  



 focused                  



 examination;                  



 Straightforw                  



 rae medical                  



 decision                  



 making.                  



 Counselin                  



                   



                   

 

 Office or  [id number]  SNOMED-CT   ()  2019  complete  Mental      



 other  6        d  Health-      



 outpatient            [place]      



 visit for            [address],      



 established            [id number]      



 patient,            [id number]      



 which                  



 requires at                  



 least 2 of                  



 these 3 key                  



 components:                  



 A problem                  



 focused                  



 history; A                  



 problem                  



 focused                  



 examination;                  



 Straightforw                  



 rae medical                  



 decision                  



 making.                  



 Counselin                  



                   



                   

 

     SNOMED-CT   ()  2019  [address],      



             [id number]      



             [id number]      



                   



                   







Encounters/Encounter Diagnoses







 Encounter Name  Encounter  Diagnosis  Diagnosis  Diagnosis  Date of  Service



   Code  Code  Name  CodeSystem  Diagnosis  Delivery



             Location



             

 

 Psychotherapy -  21796  [id number]  Recurrent  SNOMED-CT  2019  Behavioral



 Individual 30      depressive      Health



 min      disorder,      Clinic  , ,



       current      ,



       episode      



       moderate      



             







Vital Signs

No Information



Social History







 Element Description  Description  Start  End  Code  CodeSystem  AdditionalInfo



     Date  Date      



             



             



             

 

 SexAssignedAtBirth  Female      F  AdministrativeGender  



             



             



             



             







Hospital Discharge Instructions







Reason For Referral







Medical Equipment

    FDA





Assessments

## 2019-12-16 NOTE — XMS REPORT
Continuity of Care Document (CCD)

 Created on:2019



Patient:Teresa Pittman

Sex:Female

:1985

External Reference #:[id number]





Demographics







 Address  [address]



   [place] [address]

 

 Home Phone  [phone]

 

 Mobile Phone  [phone]

 

 Preferred Language  en

 

 Marital Status  Not  or 

 

 Yazdanism Affiliation  Unknown

 

 Race  White

 

 Ethnic Group  Not  or 









Author







 Name  JAMEEL Agrawal (transmitted by agent of provider Roberto Borges)

 

 Address  [address]



   Unavailable



   [place] [address]









Care Team Providers







 Name  Role  Phone

 

 Wes Mac DO - Family Medicine  Care Team Information   [phone]
[phone]









Problems







 Active Problems  Provider  Date

 

 Uncomplicated moderate persistent  Roberto Borges MD  Onset: 08/10/2018



 asthma    

 

 Angioneurotic edema, subsequent  Roberto Borges MD  Onset: 08/10/2018



 encounter    

 

 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

 

 Allergic rhinitis  MICHELLE Rey  Onset: 2019

 

 Mild intermittent asthma  JAMEEL Agrawal  Onset: 10/28/2019







Social History







 Type  Date  Description  Comments

 

 Birth Sex    Unknown  

 

 Tobacco Use  Start: Unknown  Patient has never smoked  

 

 Tobacco Use  Start: Unknown  Second Hand Smoke Exposure  wafts in from 
neighboring



     In The Home  apartments

 

 Smoking Status  Reviewed: 10/28/19  Second Hand Smoke Exposure  wafts in from 
neighboring



     In The Home  apartments







Allergies, Adverse Reactions, Alerts







 Active Allergies  Reaction  Severity  Comments  Date

 

 Latex        08/10/2018

 

 Cipro HC        08/10/2018

 

 Clindamycin        08/10/2018

 

 Metformin        08/10/2018

 

 Penicillin        08/10/2018

 

 Wellbutrin        08/10/2018

 

 Soma        08/10/2018







Medications







 Active Medications  SIG  Qnty  Indications  Ordering Provider  Date

 

 Epinephrine  use as directed  2units  Z91.018  Roberto SHIRLEY  2019



           0.3mg/0.3ML  as needed for      Borges, MD  



 Solution Auto-Inject  anaphylaxis.        



           

 

 Albuterol Sulfate  1 vial every 4  15ml  J30.89  Roberto SHIRLEY  2019



   hours as needed      MD Jony  



 1.25mg/3ML Nebulizer  for wheezing        



           

 

 Ventolin HFA  inhale 2 puffs by  8gm  J45.40  Roberto SHIRLEY  08/10/2018



   inhalation route      MD Jony  



 108(90Base) mcg/Act  every 4 hours as        



 Aerosol  needed        



           

 

 Spironolactone  bid      Unknown  



              100mg          



 Tablets          



           

 

 Coq-10        Unknown  



      150mg Capsules          



           

 

 Biotin        Unknown  



      5000mcg Tablets          



           

 

 Chlorella        Unknown  



         500mg          



 Capsules          



           

 

 Alprazolam ER        Unknown  



             3mg          



 Tablets ER 24HR          



           







Immunizations







 Description

 

 No Information Available







Vital Signs







 Date  Vital  Result  Comment

 

 10/28/2019  3:00pm  BP Systolic  141 mmHg  









 BP Diastolic  79 mmHg  

 

 Height  68 inches  5'8"

 

 Weight  294.00 lb  

 

 BMI (Body Mass Index)  44.7 kg/m2  

 

 Heart Rate  82 /min  

 

 O2 % BldC Oximetry  99 %  









 2019  3:00pm  Height  68 inches  5'8"









 Weight  294.00 lb  

 

 BMI (Body Mass Index)  44.7 kg/m2  







Results







 Test  Acquired Date  Facility  Test  Result  H/L  Range  Note

 

 Order  10/28/2019  [facility]  Nitric Oxide  <pending>
      









 PFT Supplies  <pending>      

 

 PFT With Bronchodilator  <pending>      







Procedures







 Date  Code  Description  Status

 

 10/28/2019  17519  Nitric Oxide  Gas Determination  Completed

 

 10/28/2019  80042  Bronchodilation Responsiveness Spirometry Pre/Post  
Completed



     Bronchodil Adm  







Medical Devices







 Description

 

 No Information Available







Encounters







 Type  Date  Location  Provider  Dx  Diagnosis

 

 Office Visit  10/28/2019  [place]  JAMEEL Agrawal  J45.20  Mild intermittent 
asthma,



   3:00p        uncomplicated









 T78.3xxD  Angioneurotic edema, subsequent encounter

 

 Z91.018  Allergy to other foods

 

 J30.89  Other allergic rhinitis







Assessments







 Date  Code  Description  Provider

 

 10/28/2019  J45.20  Mild intermittent asthma, uncomplicated  JAMEEL Agrawal

 

 10/28/2019  T78.3xxD  Angioneurotic edema, subsequent encounter  JAMEEL Agrawal

 

 10/28/2019  Z91.018  Allergy to other foods  JAMEEL Agrawal

 

 10/28/2019  J30.89  Other allergic rhinitis  JAMEEL Agrawal







Plan of Treatment

10/28/2019 - JAMEEL AgrawalJ45.20 Mild intermittent asthma, 
uncomplicatedComments:Patient's PFT is within normal limits and exhaled nitric 
oxide is normal at 16 ppb.  Patient to use Ventolin for breakthrough chest 
symptoms.  Patient to use nebulizer every 4 hours as needed.  If asthma 
symptoms return, patient will contact this office for further instructions.  
Saline nasal rinse and HEPA air filter may help decrease allergens. Patient 
will continue to carry EpiPen with her at all times.  Patient will continue to 
strictly avoid mangoes.Follow up:6 months, no PFTT78.3xxD Angioneurotic edema, 
subsequent akwxwooyrU44.018 Allergy to other uvgjrX80.89 Other allergic rhinitis



Functional Status







 Description

 

 No Information Available







Mental Status







 Description

 

 No Information Available







Referrals







 Description

 

 No Information Available

## 2019-12-16 NOTE — XMS REPORT
Summary of Care

 Created on:2019



Patient:Teresa Pittman

Sex:Female

:1985

External Reference #:[id number]





Demographics







 Address  [address]



   [place] [address]

 

 Mobile Phone  [phone]

 

 Home Phone  [phone]

 

 Work Phone  [phone]

 

 Email Address  [email or web address]

 

 Preferred Language  English

 

 Marital Status  Not  or 

 

 Buddhism Affiliation  Unknown

 

 Race  White

 

 Ethnic Group  Not  or 









Author







 Organization  [facility]

 

 Address  [address] 



   [place] [address]









Support







 Name  Relationship  Address  Phone

 

 Isaiah Pittman  Unavailable  Unavailable  [phone]









Care Team Providers







 Name  Role  Phone

 

 Bubba Wes SINGH  Primary Care Provider  [phone]

 

 Meghan Bloom MD  Unavailable  [phone]

 

 Fidel Bullock  Unavailable  [phone]

 

 Neol Becerra  Unavailable  [phone]

 

 Leigh Cordero NP  Unavailable  [phone]

 

 Jelani Davis  Unavailable  [phone]

 

 Shonna Borges NP  Unavailable  [phone]









Reason for Visit







 Reason  Comments

 

 Follow Up  







Encounter Details







 Date  Type  Department  Care Team  Description

 

 10/18/2019  Office Visit  Clarence Yo,  Class 2 obesity with 
body mass index (BMI) of 37.0 to 37.9 in adult, unspecified obesity type, 
unspecified whether serious comorbidity present (Primary Dx);



     Bariatrics - Darcie MURRELL



  Diabetes mellitus without complication (HCC);



     [address]  [address]  PCOS (polycystic ovarian syndrome);



     Street



  



  Fibromyalgia;



     [place] [address]



  [place] [address]



  Hypertriglyceridemia



     [phone]  



       (Fax)  







Allergies







 Active Allergy  Reactions  Severity  Noted Date  Comments

 

 Exenatide  Rash    2018  In effective and lumps

 

 Ciprofloxacin Hcl  Hives, GI Reaction    2016  

 

 Clindamycin  Hives    2016  

 

 Latex  Rash    10/03/2016  

 

 Metformin  Other    2018  Pain in the kidneys-

 

 Penicillins  Rash    10/06/2018  



documented as of this encounter (statuses as of 10/18/2019)



Medications







 Medication  Sig  Dispensed  Refills  Start Date  End Date  Status

 

 DEVILS CLAW PO  Take 500 mg by    0      Active



   mouth TWICE DAILY.          

 

 BIOTIN PO  Take 10,000 mcg by    0      Active



   mouth TWICE DAILY.          

 

 albuterol HFA  Take 2 Puffs by  1 Inhaler  0  2018    Active



 (VENTOLIN) 108 (90  inhalation TWICE          



 Base) MCG/ACT  DAILY.          



 Inhalation Aero Soln            

 

 Blood Glucose Monitor  1 Each by Does not  1 Device  0  05/15/2018    Active



 Software Does not  apply route AS          



 apply Device  DIRECTED. Brand:          



   Insurance          



   preferred Dx:          



   E11.9, NON-insulin          



   dependent, test BG          



   1x daily          

 

 Chaste Tree (VITEX  Take  by mouth.    0      Active



 EXTRACT PO)            

 

 ALPRAZolam (XANAX) 0.5  Take 1 Tab by  60 Tab  0  2018    Active



 MG Oral  mouth TWO TIMES          



 TabIndications:  DAILY AS NEEDED          



 Anxiety and depression  for anxiety. Max          



   Daily Amount: 1          



   mg. No driving,          



   alcohol use or          



   machinery work          



   with it          

 

 Spironolactone 100 MG  Take 1 Tab by  180 Tab  1  2018    Active



 Oral Tab  mouth TWICE DAILY.          

 

 EPINEPHrine HCl,  Inject  within a    0      Active



 Anaphylaxis, (EPIPEN  muscle AS NEEDED.          



 2-CALI IM)            

 

 Insulin Pen Needle  1 Each by  100 Each  5  2019    Active



 (PEN NEEDLES 3/16")  Injection route          



 31G X 5 MM Does not  THREE TIMES DAILY          



 apply Misc  BEFORE MEALS.          



   Insulin dependent          



   diabetes          

 

 Lancets Does not apply  by Does not apply  100 Each  3  2019    Active



 Misc  route DAILY.          



   Brand: Insurance          



   preferred Dx:          



   E11.9, NON-insulin          



   dependent, test BG          



   1x daily          

 

 Glucose Blood In Vitro  1 Each by In Vitro  100 Each  3  2019    Active



 Strip  route DAILY.          



   Brand: Insurance          



   preferred Dx:          



   E11.9, NON-insulin          



   dependent, test BG          



   1x daily          



documented as of this encounter (statuses as of 10/18/2019)



Active Problems







 Problem  Noted Date

 

 Uncontrolled type 2 diabetes mellitus with complication, without long-term  2019



 current use of insulin  

 

 Elevated hemoglobin A1c  2018

 

 Obesity, Class III, BMI 40-49.9 (morbid obesity)  2017

 

 TOMEKA (obstructive sleep apnea)  2017









 Overview: 







 On CPAP, diagnosed in .









 Depression  2017

 

 Neurocardiogenic syncope  2017

 

 Dizziness and giddiness  2017

 

 Heat syncope  2017

 

 Palpitations  2017

 

 Postural dizziness with presyncope  2017

 

 PCOS (polycystic ovarian syndrome)  2017

 

 SOB (shortness of breath)  10/03/2016

 

 Leg swelling  10/03/2016

 

 Abnormal weight gain  2016

 

 Hirsutism  2016

 

 Syncope  2016

 

 Hair loss  2016

 

 Weight gain  2016



documented as of this encounter (statuses as of 10/18/2019)



Social History







 Tobacco Use  Types  Packs/Day  Years Used  Date

 

 Never Smoker        









 Smokeless Tobacco: Never Used      









 Alcohol Use  Drinks/Week  oz/Week  Comments

 

 Not Currently      rarely









 Sex Assigned at Birth  Date Recorded

 

 Not on file  









 Job Start Date  Occupation  Industry

 

 Not on file  Not on file  Not on file









 Travel History  Travel Start  Travel End









 No recent travel history available.



documented as of this encounter



Last Filed Vital Signs







 Vital Sign  Reading  Time Taken  Comments

 

 Blood Pressure  114/76  10/18/2019  1:04 PM EDT  

 

 Pulse  82  10/18/2019  1:04 PM EDT  

 

 Temperature  -  -  

 

 Respiratory Rate  -  -  

 

 Oxygen Saturation  97%  10/18/2019  1:04 PM EDT  

 

 Inhaled Oxygen Concentration  -  -  

 

 Weight  116 kg (255 lb 11.2 oz)  10/18/2019  1:04 PM EDT  

 

 Height  175.3 cm (5' 9")  10/18/2019  1:04 PM EDT  

 

 Body Mass Index  37.76  10/18/2019  1:04 PM EDT  



documented in this encounter



Progress Notes

Clarence Rivera, CNP - 10/18/2019  1:00 PM EDTFormatting of this note might be 
different from the original.

PATIENT:  Teresa Pittman

MRN:  [id number]

:  1985

DATE OF SERVICE:  10/18/2019



REFERRING PRACTITIONER:  Self

PRIMARY CARE PROVIDER:  Wes Mac



Chief Complaint

Patient presents with

 Follow Up



HISTORY OF PRESENT ILLNESS:

Teresa Pittman is a 34-y.o. female who presents for a follow up treatment of 
obesity and related diseases as indicated in the "Impression and Plan" sections 
of this note.



She reports no new medical issues in the interim. She was not prescribed any 
new medications at the last visit.



Her dietary compliance as reported is good.  She is not skipping meals. She  is 
not keeping a consistent food journal.  She is eating an unknown amount of 
calories as no journal was provided.  She  is generally getting adequate 
protein.



In regard to an exercise regimen, she reports no regular exercise.



Past Medical History:

Diagnosis Date

 Asthma

 Colon polyp

 Dysmetabolic syndrome

 Elevated hemoglobin A1c 2018

 Fibromyalgia

 Heart murmur

 born with a murmur

 Heart palpitations

 PCO (polycystic ovaries)

 Seizure (HCC)

 Uncontrolled type 2 diabetes mellitus with complication, without long-
term current use of insulin (HCC) 2019



Past Surgical History:

Procedure Laterality Date

 COLONOSCOPY N/A 2018

 Procedure: COLONOSCOPY;  Surgeon: Mello Prabhakar DO;  Location: [facility] OR
;  Laterality: N/A;

 ENDO BIOPSY WITH COLP (ADD'N)

 NE REMOVAL GALLBLADDER

 TONSILLECTOMY



Family History

Problem Relation Age of Onset

 Diabetes Mother

 Heart Disease Mother

     heart murmur

 Skin Cancer Mother

 MS Father

 Cancer Maternal Grandmother

     ovarian

 Heart Disease Maternal Grandmother

 Heart Disease Maternal Grandfather

 Obesity Maternal Grandfather

 Acne Paternal Grandmother



Social History



Tobacco Use

 Smoking status: Never Smoker

 Smokeless tobacco: Never Used

Substance Use Topics

 Alcohol use: Not Currently

  Comment: rarely



Current Outpatient Medications

Medication Sig

 albuterol HFA (VENTOLIN) 108 (90 Base) MCG/ACT Inhalation Aero Soln Take 
2 Puffs by inhalation TWICE DAILY.

 ALPRAZolam (XANAX) 0.5 MG Oral Tab Take 1 Tab by mouth TWO TIMES DAILY 
AS NEEDED for anxiety.Max Daily Amount: 1 mg. No driving, alcohol use or 
machinery work with it

 BIOTIN PO Take 10,000 mcg by mouth TWICE DAILY.

 Blood Glucose Monitor Software Does not apply Device 1 Each by Does not 
apply route AS DIRECTED. Brand: Insurance preferred Dx: E11.9, NON-insulin 
dependent, test BG 1x daily

 Chaste Tree (VITEX EXTRACT PO) Take  by mouth.

 DEVILS CLAW PO Take 500 mg by mouth TWICE DAILY.

 EPINEPHrine HCl, Anaphylaxis, (EPIPEN 2-CALI IM) Inject  within a muscle 
AS NEEDED.

 Glucose Blood In Vitro Strip 1 Each by In Vitro route DAILY. Brand: 
Insurance preferred Dx: E11.9, NON-insulin dependent, test BG 1x daily

 Insulin Pen Needle (PEN NEEDLES 316") 31G X 5 MM Does not apply Misc 1 
Each by Injection route THREE TIMES DAILY BEFORE MEALS. Insulin dependent 
diabetes

 Lancets Does not apply Misc by Does not apply route DAILY. Brand: 
Insurance preferred Dx: E11.9, NON-insulin dependent, test BG 1x daily

 Spironolactone 100 MG Oral Tab Take 1 Tab by mouth TWICE DAILY.



No current facility-administered medications for this visit.



Allergies

Allergen Reactions

 Bydureon [Exenatide] Rash

  In effective and lumps

 Ciprofloxacin Hcl Hives and GI Reaction

 Clindamycin Hives

 Latex Rash

 Metformin Other

  Pain in the kidneys-

 Penicillins Rash



There are no exam notes on file for this visit.



REVIEW OF SYSTEMS:

CONSTITUTIONAL: Negative

RESPIRATORY:  Negative

CARDIOVASCULAR:  Negative

GASTROINTESTINAL:  Negative.

GENITOURINARY:Negative

MUSCULOSKELETAL:  Negative

NEUROLOGICAL:  Negative.

PSYCH: Negative.



PHYSICAL EXAMINATION:

VITALS:  /76  | Pulse 82  | Ht 5' 9" (1.753 m)  | Wt 255 lb 11.2 oz (116 
kg)  | SpO2 97%  | BMI 37.76 kg/m



BODY COMPOSITION AND MEASUREMENTS:



BODY COMPOSITION HISTORY



Body Composition 2019 2019 10/18/2019

Weight 253 lb 3.2 oz 255 lb 6.4 oz 255 lb 11.2 oz

LEAN BODY MASS (lbs) 140 - 150.6

BODY FAT MASS (lbs) 113.2 - 105.1

BMI (Calculated) 37.39 37.72 37.76

Body Fat % 44.7 - 41.1

BASAL METABOLIC RATE (kcal) 1742 - 1845

EXCESS BODY FAT (lbs) 71.4 - 60.2

VISCERAL FAT AREA (cm2) 219.5 - 212.3

LEAN BODY MASS DEFICIT (lbs) 0 - 0

NECK CIRCUMFERENCE (in) - - -

WAIST CIRCUMFERENCE (in) - - -

HIP CIRCUMFERENCE (in) - - -



GENERAL:  No acute distress; moderately obese

PULMONARY: Clear to auscultation bilaterally

CARDIOVASCULAR:  Regular rate and rhythm; no murmurs, no rubs, no gallops

ABDOMEN:  Soft, non tender, no organomegaly, no masses appreciated. truncal 
adiposity present

 MUSCULOSKELETAL: no clubbing, cyanosis or edema

NEUROLOGICAL:  Alert and oriented x 3

PSYCH: Appropriate mood and affect



LABORATORY STUDIES:

Triglycerides

Date Value Ref Range Status

2019 161 &lt;150 mg/dl Final



HDL Cholesterol

Date Value Ref Range Status

2019 39 &gt;50 mg/dl Final



LDL Cholesterol

Date Value Ref Range Status

2019 125 &lt;100 MG/DL Final



TSH

Date Value Ref Range Status

2018 4.63 0.47 - 4.68 uIU/mL Final



IMPRESSION:

  ICD-9-CM ICD-10-CM

1. Class 2 obesity with body mass index (BMI) of 37.0 to 37.9 in adult, 
unspecified obesity type, unspecified whether serious comorbidity present 
278.00 E66.9 GLYCOHEMOGLOBIN (AMB POCT)

 V85.37 Z68.37

2. Diabetes mellitus without complication (HCC) 250.00 E11.9

3. PCOS (polycystic ovarian syndrome) 256.4 E28.2

4. Fibromyalgia 729.1 M79.7

5. Hypertriglyceridemia 272.1 E78.1





PLAN:

Teresa Pittman is a 34-y.o. year-old female with Body mass index is 37.76 kg/
m..



Obesity Class II

Weight is increased 2.5 lbs in the interim; this reflects a(n) increase in lean 
body mass of 10.6 lbs (7.3 lbs of which was total body water) and decrease in 
fat mass of 8.1 lbs

Continue with ketogenic diet

Moderate intensity exercise for 30 minutes, at least 5 days per week



Type 2 diabetes mellitis (A1c 5.7% on 10/18/2019, down from 6.8% on 2019):

- We discussed the positive health related effects resulting from her weight 
loss.

- She reports no symptoms of hypoglycemia.

- Self reported blood sugars have been running she does not check.

- Continue diet with carbohydrate restriction and exercise as above.

- Will continue to monitor A1c q 3 months and blood sugar PRN.



Polycystic ovarian syndrome (PCOS):

- Diet with carbohydrate restriction as above.



Fibromyalgia:

- Likely exacerbated by excess weight.

- Diet and exercise as above.



Hypertriglyceridemia:

- Pattern suggestive of insulin resistance.

- Diet with carbohydrate restriction and exercise as above.

- Will continue to monitor lipid profile.



Grethel sleep scale and PHQ9 depression scale were last done on 2018 and .



Follow up:  4 weeks with Dr. Davis.



Author:  Clarence Askey, CNP 10/18/2019 13:27



Electronically signed by Clarence Rivera CNP at 10/18/2019  1:29 PM EDTdocumented 
in this encounter



Plan of Treatment







 Date  Type  Specialty  Care Team  Description

 

 11/15/2019  Office Visit  Bariatrics  Clarence Rivera CNP



  



       [address] [place] [address]



  



       [phone] (Fax)  

 

 2019  Office Visit  Endocrinology  Meghan Bloom MD



  



       [address]



  



       [place] [address]



  



       [phone] (Fax)  









 Health Maintenance  Due Date  Last Done  Comments

 

 Diabetic Eye Exam  1985    

 

 MEDICARE ANNUAL WELLNESS  1985    



 VISIT      

 

 PAP SMEAR  1986    

 

 PNEUMOCOCCAL 0-64 YRS (1 of  1992    



 1 - PPSV23)      

 

 HIV SCREENING  2001    

 

 FOOT EXAM  2004    

 

 URINE MICROALBUMIN  2019  

 

 INFLUENZA VACCINE (#1)  2019    

 

 DEPRESSION SCREENING  2019, 2018  

 

 HEMOGLOBIN A1C  2019, 2019,  



     10/26/2018, Additional  



     history exists  

 

 HPV IMMUNIZATION SERIES  Aged Out    No longer eligible



       based on patient's age



       to complete this topic

 

 MENINGOCOCCAL VACCINE IMM  Aged Out    No longer eligible



       based on patient's age



       to complete this topic



documented as of this encounter



Goals







 Goal  Patient Goal  Associated  Recent  Patient-Stated?  Author



   Type  Problems  Progress    

 

 Depression  Depression    0 (2018  No  [place],



 screen (PHQ-9)       1:21 PM EDT)    MD Traci



 total score < 5          









 Note: 



This is an individualized treatment (depression) goal for Teresa Pittman:



 Displayed above is your goal for a depression screening (PHQ-9) score that 
would indicate good control of your depression.









 Glycohemoglobin A1c < 7.0  Diabetes    5.7 (10/18/2019  1:03 PM  No  Ramona Rice RN



       EDT)    









 Note: 



This is an individualized treatment (diabetes control, HgbA1C) goal for Teresa Pittman:



 Displayed above is your progress towards your HgbA1C goal.  Your goal is shown 
above (on the left); your most recent HgbA1C is shown on the right.  Note that 
lower numbers are better.









 Keep a regular sleep schedule  Lifestyle      No  Traci Hall MD









 Note: 



This is an individualized lifestyle goal for Teresa Pittman:



 Please maintain a regular sleep schedule.  This may help with some symptoms of 
depression.









 Weight loss vs. 18 mo  Lifestyle    37.7 (10/18/2019  1:04 PM  No  Shanika Rice RN



 max (lbs) >= 10      EDT)    









 Note: 



This is an individualized lifestyle goal for Teresa Pittman:



 Your body mass index (BMI) is more than 30.  You should lose weight.  A 
reasonable starting goal is to lose 10 pounds.



 Displayed above is how many pounds you have lost thus far towards your 10 
pound weight loss goal.









 Keep immunizations current  Lifestyle      No  Ramona Rice RN









 Note: 



This is an individualized lifestyle goal for Teresa Pittman:



 Please be sure to keep up-to-date on recommended immunizations.  For example, 
this would include a yearly influenza vaccine.



 Immunization status can be seen by looking at the Health Maintenance sections 
of your [facility], Plan of Care, and any After Visit Summaries.









 Take all prescribed medications as directed  Self-management      No  Traci Hall MD









 Note: 



This is an individualized self-management goal for Teresa Pittman:



 Please take all prescribed medications as directed.



 1.  Do not skip doses.  If you cannot afford your medications, talk with your 
doctor.



 2.  Use a pill reminder system such as a pill box if needed.  Your pharmacist 
can help you with this.



 3.  Contact your Pharmacy 5 days before your medication runs out.  If you 
cannot take your medications for any reasons, talk with your doctor.



 4.  Please bring all of your medication bottles and inhalers (or a list of all 
your medications/inhalers) with you to every visit.



 Potential barriers to meeting all of your care plan goals will continue to be 
addressed on an ongoing basis.



documented as of this encounter



Procedures







 Procedure Name  Priority  Date/Time  Associated  Comments



       Diagnosis  

 

 GLYCOHEMOGLOBIN (AMB  Routine  10/18/2019  1:03  Class 2 obesity  Results for 
this



 POCT)    PM EDT  with body mass  procedure are in



       index (BMI) of 37.0  the results



       to 37.9 in adult,  section.



       unspecified obesity  



       type, unspecified  



       whether serious  



       comorbidity present  



documented in this encounter



Results

GLYCOHEMOGLOBIN (AMB POCT) (10/18/2019  1:03 PM EDT)





 Component  Value  Ref Range  Performed At  Pathologist Signature

 

 GLYCOHEMOGLOBIN (POCT)  5.7 (A)  3.9 - 5.6 %  [facility] POCT  









 Specimen

 

 









 Performing Organization  Address  City/State/Zipcode  Phone Number

 

 [facility]  [address] [place] [address]  



documented in this encounter



Visit Diagnoses







 Diagnosis

 

 Class 2 obesity with body mass index (BMI) of 37.0 to 37.9 in adult, 
unspecified



 obesity type, unspecified whether serious comorbidity present - Primary

 

 Diabetes mellitus without complication (HCC)







 Type II or unspecified type diabetes mellitus without mention of complication, 
not



 stated as uncontrolled

 

 PCOS (polycystic ovarian syndrome)







 Polycystic ovaries

 

 Fibromyalgia







 Mylagia and myositis, unspecified

 

 Hypertriglyceridemia







 Pure hyperglyceridemia



documented in this encounter



Insurance







 Payer  Benefit Plan /  Subscriber ID  Effective Dates  Phone  Address  Type



   Group          

 

 MEDICARE  MEDICARE PART A  xxxxxxxxxxx  2015-Present      Medicare



   & B          

 

 MEDICAID [facility]  xxxxxxxx  2017-Present      Medicaid [place]



   MEDICAID          









 Guarantor Name  Account Type  Relation to  Date of  Phone  Billing



     Patient  Birth    Address

 

 Teresa Pittman  Personal/Family    1985  [phone]  [address]



         (Home)



  [address]







         [phone]  [place]



         (Work)  [address]



documented as of this encounter



Advance Directives







 Type  Date Recorded  Patient Representative  Explanation

 

 Advance Directives  2018  9:32 AM    Health Care Proxy

## 2019-12-16 NOTE — XMS REPORT
Summary of Care

 Created on:2019



Patient:Teresa Pittman

Sex:Female

:1985

External Reference #:[id number]





Demographics







 Address  [address]



   [place] [address]

 

 Mobile Phone  [phone]

 

 Home Phone  [phone]

 

 Work Phone  [phone]

 

 Email Address  [email or web address]

 

 Preferred Language  English

 

 Marital Status  Not  or 

 

 Spiritism Affiliation  Unknown

 

 Race  White

 

 Ethnic Group  Not  or 









Author







 Organization  [facility]

 

 Address  [address]



   [place] [address]









Support







 Name  Relationship  Address  Phone

 

 Isaiah Pittman  Unavailable  Unavailable  [phone]









Care Team Providers







 Name  Role  Phone

 

 Meghan Bloom MD  Unavailable  [phone]

 

 Fidel Bullock  Unavailable  [phone]

 

 Noel Becerra  Unavailable  [phone]

 

 Leigh Cordero NP  Unavailable  [phone]

 

 Jelani Davis  Unavailable  [phone]

 

 Shonna Borges NP  Unavailable  [phone]

 

 Adrienne Gallegos  Primary Care Provider  [phone]









Reason for Visit







 Reason  Comments

 

 Follow Up  







Encounter Details







 Date  Type  Department  Care Team  Description

 

 2019  Office Visit  [facility],  Morbid obesity, 
unspecified obesity type (HCC) (Primary Dx);



     Bariatrics - Darcie Palomares MD



  Body mass index (BMI) of 38.0-38.9 in adult;



     [address] [facility] [address] [facility] [place]  [address] [facility]



  Diabetes mellitus without complication (HCC)



     [place]



  [place] [id number]



  



     [place] [address] [phone] (Fax)  







Allergies







 Active Allergy  Reactions  Severity  Noted Date  Comments

 

 Exenatide  Rash    2018  In effective and lumps

 

 Ciprofloxacin Hcl  Hives, GI Reaction    2016  

 

 Clindamycin  Hives    2016  

 

 Latex  Rash    10/03/2016  

 

 Metformin  Other    2018  Pain in the kidneys-

 

 Penicillins  Rash    10/06/2018  



documented as of this encounter (statuses as of 2019)



Medications







 Medication  Sig  Dispensed  Refills  Start Date  End Date  Status

 

 DEVILS CLAW PO  Take 500 mg by    0      Active



   mouth TWICE DAILY.          

 

 BIOTIN PO  Take 10,000 mcg by    0      Active



   mouth TWICE DAILY.          

 

 albuterol HFA  Take 2 Puffs by  1 Inhaler  0  2018    Active



 (VENTOLIN) 108 (90  inhalation TWICE          



 Base) MCG/ACT  DAILY.          



 Inhalation Aero Soln            

 

 Blood Glucose Monitor  1 Each by Does not  1 Device  0  05/15/2018    Active



 Software Does not  apply route AS          



 apply Device  DIRECTED. Brand:          



   Insurance          



   preferred Dx:          



   E11.9, NON-insulin          



   dependent, test BG          



   1x daily          

 

 Chaste Tree (VITEX  Take  by mouth.    0      Active



 EXTRACT PO)            

 

 ALPRAZolam (XANAX) 0.5  Take 1 Tab by  60 Tab  0  2018    Active



 MG Oral  mouth TWO TIMES          



 TabIndications:  DAILY AS NEEDED          



 Anxiety and depression  for anxiety. Max          



   Daily Amount: 1          



   mg. No driving,          



   alcohol use or          



   machinery work          



   with it          

 

 EPINEPHrine HCl,  Inject  within a    0      Active



 Anaphylaxis, (EPIPEN  muscle AS NEEDED.          



 2-CALI IM)            

 

 Insulin Pen Needle  1 Each by  100 Each  5  2019    Active



 (PEN NEEDLES 3/16")  Injection route          



 31G X 5 MM Does not  THREE TIMES DAILY          



 apply Misc  BEFORE MEALS.          



   Insulin dependent          



   diabetes          

 

 Lancets Does not apply  by Does not apply  100 Each  3  2019    Active



 Misc  route DAILY.          



   Brand: Insurance          



   preferred Dx:          



   E11.9, NON-insulin          



   dependent, test BG          



   1x daily          

 

 Glucose Blood In Vitro  1 Each by In Vitro  100 Each  3  2019    Active



 Strip  route DAILY.          



   Brand: Insurance          



   preferred Dx:          



   E11.9, NON-insulin          



   dependent, test BG          



   1x daily          

 

 Spironolactone 100 MG  Take 1 Tab by  180 Tab  1  10/31/2019    Active



 Oral Tab  mouth TWICE DAILY.          



documented as of this encounter (statuses as of 2019)



Active Problems







 Problem  Noted Date

 

 Uncontrolled type 2 diabetes mellitus with complication, without long-term  2019



 current use of insulin  

 

 Elevated hemoglobin A1c  2018

 

 Obesity, Class III, BMI 40-49.9 (morbid obesity)  2017

 

 TOMEKA (obstructive sleep apnea)  2017









 Overview: 







 On CPAP, diagnosed in .









 Depression  2017

 

 Neurocardiogenic syncope  2017

 

 Dizziness and giddiness  2017

 

 Heat syncope  2017

 

 Palpitations  2017

 

 Postural dizziness with presyncope  2017

 

 PCOS (polycystic ovarian syndrome)  2017

 

 SOB (shortness of breath)  10/03/2016

 

 Leg swelling  10/03/2016

 

 Abnormal weight gain  2016

 

 Hirsutism  2016

 

 Syncope  2016

 

 Hair loss  2016

 

 Weight gain  2016



documented as of this encounter (statuses as of 2019)



Social History







 Tobacco Use  Types  Packs/Day  Years Used  Date

 

 Never Smoker        









 Smokeless Tobacco: Never Used      









 Alcohol Use  Drinks/Week  oz/Week  Comments

 

 Not Currently      rarely









 Sex Assigned at Birth  Date Recorded

 

 Not on file  









 Job Start Date  Occupation  Industry

 

 Not on file  Not on file  Not on file









 Travel History  Travel Start  Travel End









 No recent travel history available.



documented as of this encounter



Last Filed Vital Signs







 Vital Sign  Reading  Time Taken  Comments

 

 Blood Pressure  132/76  2019  2:13 PM EST  

 

 Pulse  88  2019  2:13 PM EST  

 

 Temperature  -  -  

 

 Respiratory Rate  -  -  

 

 Oxygen Saturation  98%  2019  2:13 PM EST  

 

 Inhaled Oxygen Concentration  -  -  

 

 Weight  117.3 kg (258 lb 8 oz)  2019  2:13 PM EST  

 

 Height  175.3 cm (5' 9")  2019  2:13 PM EST  

 

 Body Mass Index  38.17  2019  2:13 PM EST  



documented in this encounter



Progress Notes

Jelani Davis MD - 2019  2:00 PM ESTFormatting of this note might 
be different from the original.

PATIENT:  Teresa Pittman

MRN:  [id number]

:  1985

DATE OF SERVICE:  2019



REFERRING PRACTITIONER:  Self-Referred

PRIMARY CARE PROVIDER:  Adrienne Gallegos



Chief Complaint

Patient presents with

 Follow Up



HISTORY OF PRESENT ILLNESS:

Teresa Pittman is a 34-y.o. female who presents for follow up  treatment of 
obesity and related diseases.



She reports no new medical issues in the interim.  She also struggles with SAD 
this time of year andis contemplating starting meds.



She notes that she struggled with diet the past few weeks.  She plans to go 
back on a keto diet.  She has started to meal prep.



She is just getting back into yoga and meditation.



Past Medical History:

Diagnosis Date

 Asthma

 Colon polyp

 Dysmetabolic syndrome

 Elevated hemoglobin A1c 2018

 Fibromyalgia

 Heart murmur

 born with a murmur

 Heart palpitations

 PCO (polycystic ovaries)

 Seizure (HCC)

 Uncontrolled type 2 diabetes mellitus with complication, without long-
term current use of insulin (HCC) 2019



Past Surgical History:

Procedure Laterality Date

 COLONOSCOPY N/A 2018

 Procedure: COLONOSCOPY;  Surgeon: Mello Prabhakar DO;  Location: [facility] GI OR
;  Laterality: N/A;

 ENDO BIOPSY WITH COLP (ADD'N)

 [place] REMOVAL GALLBLADDER

 TONSILLECTOMY



Family History

Problem Relation Age of Onset

 Diabetes Mother

 Heart Disease Mother

     heart murmur

 Skin Cancer Mother

 MS Father

 Cancer Maternal Grandmother

     ovarian

 Heart Disease Maternal Grandmother

 Heart Disease Maternal Grandfather

 Obesity Maternal Grandfather

 Acne Paternal Grandmother



Social History



Tobacco Use

 Smoking status: Never Smoker

 Smokeless tobacco: Never Used

Substance Use Topics

 Alcohol use: Not Currently

  Comment: rarely



Current Outpatient Medications

Medication Sig

 albuterol HFA (VENTOLIN) 108 (90 Base) MCG/ACT Inhalation Aero Soln Take 
2 Puffs by inhalation TWICE DAILY.

 ALPRAZolam (XANAX) 0.5 MG Oral Tab Take 1 Tab by mouth TWO TIMES DAILY 
AS NEEDED for anxiety.Max Daily Amount: 1 mg. No driving, alcohol use or 
machinery work with it

 BIOTIN PO Take 10,000 mcg by mouth TWICE DAILY.

 Blood Glucose Monitor Software Does not apply Device 1 Each by Does not 
apply route AS DIRECTED. Brand: Insurance preferred Dx: E11.9, NON-insulin 
dependent, test BG 1x daily

 Chaste Tree (VITEX EXTRACT PO) Take  by mouth.

 DEVILS CLAW PO Take 500 mg by mouth TWICE DAILY.

 EPINEPHrine HCl, Anaphylaxis, (EPIPEN 2-CALI IM) Inject  within a muscle 
AS NEEDED.

 Glucose Blood In Vitro Strip 1 Each by In Vitro route DAILY. Brand: 
Insurance preferred Dx: E11.9, NON-insulin dependent, test BG 1x daily

 Insulin Pen Needle (PEN NEEDLES 316") 31G X 5 MM Does not apply Misc 1 
Each by Injection route THREE TIMES DAILY BEFORE MEALS. Insulin dependent 
diabetes

 Lancets Does not apply Misc by Does not apply route DAILY. Brand: 
Insurance preferred Dx: E11.9, NON-insulin dependent, test BG 1x daily

 Spironolactone 100 MG Oral Tab Take 1 Tab by mouth TWICE DAILY.



No current facility-administered medications for this visit.



Allergies

Allergen Reactions

 Bydureon [Exenatide] Rash

  In effective and lumps

 Ciprofloxacin Hcl Hives and GI Reaction

 Clindamycin Hives

 Latex Rash

 Metformin Other

  Pain in the kidneys-

 Penicillins Rash



REVIEW OF SYSTEMS:

CONSTITUTIONAL: Negative

RESPIRATORY:  Negative

CARDIOVASCULAR:  Negative

GASTROINTESTINAL:  Negative.

GENITOURINARY:Negative

MUSCULOSKELETAL:  Negative

NEUROLOGICAL:  Negative.

PSYCH: Negative.



Nursing Notes:

De La RosaShanika [email or web address]  2019  2:26 PM  Signed

The [place] Sleepiness Scale

Sitting and reading: Slight chance of dozing

Watching television: Slight chance of dozing

Sitting inactive in a public place - for example, a theater or meeting: Slight 
chance of dozing

As a passenger in a car for an hour without a break: Moderate chance of dozing

Lying down to rest in the afternoon: High chance of dozing

Sitting and talking to someone: Would never doze

Sitting quietly after lunch (when you've had no alcohol): Would never doze

In a car, while stopped in traffic: Would never doze

Total Score: 8



Depression Screening

Over the last 2 weeks, have you been feeling down, depressed, anxious, or 
hopeless?: Several days

Over the past 2 weeks, have you felt little interest or pleasure in doing things
?: Several days



PHYSICAL EXAMINATION:

VITALS:  /76 (BP Location: Right arm, Patient Position: Sitting)  | Pulse 
88  | Ht 5' 9" (1.753 m)  | Wt 258 lb 8 oz (117.3 kg)  | LMP 2019  | SpO2 
98%  | Breastfeeding No  | BMI 38.17 kg/m



BODY COMPOSITION AND MEASUREMENTS:



BODY COMPOSITION HISTORY



Body Composition 2019 10/18/2019 2019

Weight 255 lb 6.4 oz 255 lb 11.2 oz 258 lb 8 oz

LEAN BODY MASS (lbs) - 150.6 145.7

BODY FAT MASS (lbs) - 105.1 112.8

BMI (Calculated) 37.72 37.76 38.17

Body Fat % - 41.1 43.6

BASAL METABOLIC RATE (kcal) - 1845 1798

EXCESS BODY FAT (lbs) - 60.2 69.2

VISCERAL FAT AREA (cm2) - 212.3 226.1

LEAN BODY MASS DEFICIT (lbs) - 0 0

NECK CIRCUMFERENCE (in) - - -

WAIST CIRCUMFERENCE (in) - - -

HIP CIRCUMFERENCE (in) - - -



GENERAL:  No acute distress; morbidly obese

PULMONARY: Clear to auscultation bilaterally

CARDIOVASCULAR:  Regular rate and rhythm; no murmurs, no rubs, no gallops

ABDOMEN:  Soft, non tender, no organomegaly, no masses appreciated. truncal 
adiposity present

 MUSCULOSKELETAL: no clubbing, cyanosis or edema

NEUROLOGICAL:  Alert and oriented x 3

PSYCH: Appropriate mood and affect



LABORATORY STUDIES:

Triglycerides

Date Value Ref Range Status

2019 161 &lt;150 mg/dl Final



HDL Cholesterol

Date Value Ref Range Status

2019 39 &gt;50 mg/dl Final



LDL Cholesterol

Date Value Ref Range Status

2019 125 &lt;100 MG/DL Final



TSH

Date Value Ref Range Status

2018 4.63 0.47 - 4.68 uIU/mL Final



IMPRESSION:

  ICD-9-CM ICD-10-CM

1. Morbid obesity, unspecified obesity type (HCC) 278.01 E66.01

2. Body mass index (BMI) of 38.0-38.9 in adult V85.38 Z68.38

3. Diabetes mellitus without complication (HCC) 250.00 E11.9





PLAN:

Teresa Pittman is a 34-y.o. year-old female with Body mass index is 38.17 kg/
m..



Obesity Class II

Weight is increased 2.8 lbs in the interim; this reflects a(n) decrease in lean 
body mass of 4.9 lbs(3.3 lbs of which was total body water) and increase in fat 
mass of 7.7 lbs

Continue with ketogenic diet

Moderate intensity exercise for 30 minutes, at least 5 days per week



Diabetes

Well controlled - last A1c at 5.7

Carb restriction and exercise

No medication changes



Follow up:  4 weeks



Author:  Jelani Davis MD 2019 14:26



Electronically signed by Jelani Davis MD at 2019  2:38 PM 
ESTdocumented in this encounter



Plan of Treatment







 Date  Type  Specialty  Care Team  Description

 

 2019  Office Visit  Endocrinology  Meghan Bloom MD



  



       [address]



  



       [place] [address] [phone] (Fax)  









 Health Maintenance  Due Date  Last Done  Comments

 

 Diabetic Eye Exam  1985    

 

 MEDICARE ANNUAL WELLNESS  1985    



 VISIT      

 

 PNEUMOCOCCAL 0-64 YRS (1 of  1992    



 1 - PPSV23)      

 

 DTaP/Tdap/Td Vaccines (1 -  1997    



 Tdap)      

 

 HIV SCREENING  2001    

 

 FOOT EXAM  2004    

 

 PAP SMEAR  2007    

 

 URINE MICROALBUMIN  2019  

 

 INFLUENZA VACCINE (#1)  2019    

 

 DEPRESSION SCREENING  2019, 2018  

 

 HEMOGLOBIN A1C  2020  10/18/2019, 2019,  



     2019, Additional  



     history exists  

 

 HEPATITIS A IMMUNIZATION  Aged Out    No longer eligible



 SERIES      based on patient's age



       to complete this topic

 

 HPV IMMUNIZATION SERIES  Aged Out    No longer eligible



       based on patient's age



       to complete this topic

 

 MENINGOCOCCAL VACCINE IMM  Aged Out    No longer eligible



       based on patient's age



       to complete this topic



documented as of this encounter



Goals







 Goal  Patient Goal  Associated  Recent  Patient-Stated?  Author



   Type  Problems  Progress    

 

 Depression  Depression    0 (2018  No  Isabel



 screen (PHQ-9)       1:21 PM EDT)    MD Traci



 total score < 5          









 Note: 



This is an individualized treatment (depression) goal for Teresa Pittman:



 Displayed above is your goal for a depression screening (PHQ-9) score that 
would indicate good control of your depression.









 Glycohemoglobin A1c < 7.0  Diabetes    5.7 (10/18/2019  1:03 PM  No  Ramona Rice RN



       EDT)    









 Note: 



This is an individualized treatment (diabetes control, HgbA1C) goal for Teresa Pittman:



 Displayed above is your progress towards your HgbA1C goal.  Your goal is shown 
above (on the left); your most recent HgbA1C is shown on the right.  Note that 
lower numbers are better.









 Keep a regular sleep schedule  Lifestyle      No  Traci Hall MD









 Note: 



This is an individualized lifestyle goal for Teresa Pittman:



 Please maintain a regular sleep schedule.  This may help with some symptoms of 
depression.









 Weight loss vs. 18 mo  Lifestyle    34.8 (2019  2:13 PM  No  Shanika Rice RN



 max (lbs) >= 10      EST)    









 Note: 



This is an individualized lifestyle goal for Teresa Pittman:



 Your body mass index (BMI) is more than 30.  You should lose weight.  A 
reasonable starting goal is to lose 10 pounds.



 Displayed above is how many pounds you have lost thus far towards your 10 
pound weight loss goal.









 Keep immunizations current  Lifestyle      No  Ramona Rice RN









 Note: 



This is an individualized lifestyle goal for Teresa Pittman:



 Please be sure to keep up-to-date on recommended immunizations.  For example, 
this would include a yearly influenza vaccine.



 Immunization status can be seen by looking at the Health Maintenance sections 
of your [facility], Plan of Care, and any After Visit Summaries.









 Take all prescribed medications as directed  Self-management      Traci Nixon MD









 Note: 



This is an individualized self-management goal for Teresa Pittman:



 Please take all prescribed medications as directed.



 1.  Do not skip doses.  If you cannot afford your medications, talk with your 
doctor.



 2.  Use a pill reminder system such as a pill box if needed.  Your pharmacist 
can help you with this.



 3.  Contact your Pharmacy 5 days before your medication runs out.  If you 
cannot take your medications for any reasons, talk with your doctor.



 4.  Please bring all of your medication bottles and inhalers (or a list of all 
your medications/inhalers) with you to every visit.



 Potential barriers to meeting all of your care plan goals will continue to be 
addressed on an ongoing basis.



documented as of this encounter



Results

Not on filedocumented in this encounter



Visit Diagnoses







 Diagnosis

 

 Morbid obesity, unspecified obesity type (HCC)

 

 Body mass index (BMI) of 38.0-38.9 in adult







 Body Mass Index 38.0-38.9, adult

 

 Diabetes mellitus without complication (HCC)







 Type II or unspecified type diabetes mellitus without mention of complication, 
not



 stated as uncontrolled



documented in this encounter



Insurance







 Payer  Benefit Plan /  Subscriber ID  Effective Dates  Phone  Address  Type



   Group          

 

 MEDICARE  MEDICARE PART A  xxxxxxxxxxx  2015-Present      Medicare



   & B          

 

 MEDICAID [facility]  xxxxxxxx  2017-Present      Medicaid [place]



   MEDICAID          









 Guarantor Name  Account Type  Relation to  Date of  Phone  Billing



     Patient  Birth    Address

 

 Teresa Pittman  Personal/Family    1985  [phone]  [address]



         (Home)



  [address]







         [phone]  [place], [place]



         (Work)  [id number]



documented as of this encounter



Advance Directives







 Type  Date Recorded  Patient Representative  Explanation

 

 Advance Directives  2018  9:32 AM    Health Care Proxy

## 2019-12-16 NOTE — ED
Respiratory





- HPI Summary


HPI Summary: 


Patient is a 34-year-old female who presents emergency department for 

productive cough times several days.  Patient states she is a history of asthma 

and chronic lung infections.  Patient states she feel she has pneumonia.  

Patient has been using her nebulizer at home.  Denies associated symptoms of 

vomiting but does note nausea, diarrhea, abdominal pain, urinary symptoms.  

Symptoms are mild in severity.  No current modifying factors.








- History of Current Complaint


Chief Complaint: EDUpperRespComplaint


Stated Complaint: POSS PNEUMONIA PER PT


Time Seen by Provider: 12/16/19 11:38


Hx Obtained From: Patient


Pain Intensity: 4





- Allergy/Home Medications


Allergies/Adverse Reactions: 


 Allergies











Allergy/AdvReac Type Severity Reaction Status Date / Time


 


bupropion [From Wellbutrin] Allergy  See Comment Verified 12/16/19 11:13


 


carisoprodol [From Soma] Allergy  Vertigo Verified 12/16/19 11:13


 


ciprofloxacin Allergy  Hives Verified 12/16/19 11:13


 


clindamycin Allergy  Hives Verified 12/16/19 11:13


 


diphenhydramine Allergy  See Comment Verified 12/16/19 11:13





[From Benadryl]     


 


latex Allergy  Rash Verified 12/16/19 11:13


 


Penicillins Allergy  Nausea Verified 12/16/19 11:13


 


[employer] Allergy  Unknown Uncoded 07/08/19 18:45





   Reaction  





   Details  











Home Medications: 


 Home Medications





ALPRAZolam TAB* [Xanax TAB*] 0.5 mg PO DAILY PRN 12/16/19 [History Confirmed 12/ 16/19]


Albuterol 0.5% CONC NEB.SOL* [Albuterol 0.5ol*] 1 neb.soln INH DAILY PRN 12/16/ 19 [History Confirmed 12/16/19]


Spironolactone (NF) [Spironolactone 50 MG (NF)] 100 mg PO BID 12/16/19 [History 

Confirmed 12/16/19]











PMH/Surg Hx/FS Hx/Imm Hx


Previously Healthy: Yes


Endocrine/Hematology History: 


   Denies: Hx Anticoagulant Therapy, Hx Diabetes, Hx Thyroid Disease


Cardiovascular History: Reports: Other Cardiovascular Problems/Disorders - 

Heart Murmur


   Denies: Hx Cardiac Arrest, Hx Hypertension


Respiratory History: Reports: Hx Asthma - Mild exercise induced


   Denies: Hx Chronic Obstructive Pulmonary Disease (COPD)


GI History: 


   Denies: Hx Ulcer


 History: Reports: Other  Problems/Disorders - PCOS


   Denies: Hx Dialysis


Musculoskeletal History: Reports: Hx Fibromyalgia


Sensory History: 


   Denies: Hx Deafness


Neurological History: Reports: Other Neuro Impairments/Disorders - un dx 

seizures 


   Denies: Hx CVA


Psychiatric History: Reports: Other Psychiatric Issues/Disorders - Panic attack





- Surgical History


Surgery Procedure, Year, and Place: Cyst removal back of head 2008, Gallbladder 

removal, ganglion cyst removed from left wrist, tonsillectomy 1989.





- Immunization History


Date of Tetanus Vaccine: unknown


Date of Influenza Vaccine: none


Infectious Disease History: No


Infectious Disease History: 


   Denies: Hx Clostridium Difficile, Hx Hepatitis, Hx Human Immunodeficiency 

Virus (HIV), Hx of Known/Suspected MRSA, Hx Shingles, Hx Tuberculosis, Hx Known/

Suspected VRSA, History Other Infectious Disease, Traveled Outside the [place] in 

Last 30 Days





- Family History


Known Family History: Positive: Cardiac Disease, Non-Contributory





- Social History


Occupation: Unemployed


Lives: With Family


Alcohol Use: None


Hx Substance Use: No


Substance Use Type: Reports: None


Substance Use Comment - Amount & Last Used: one inhalation 2 weeks ago


Hx Tobacco Use: No


Smoking Status (MU): Never Smoked Tobacco





Review of Systems


Positive: Chills


Eyes: Negative


ENT: Negative


Cardiovascular: Negative


Positive: Shortness Of Breath, Cough


Positive: Nausea.  Negative: Abdominal Pain, Vomiting, Diarrhea


Genitourinary: Negative


Skin: Negative


Neurological: Negative


All Other Systems Reviewed And Are Negative: Yes





Physical Exam


Triage Information Reviewed: Yes


Vital Signs On Initial Exam: 


 Initial Vitals











Temp Pulse Resp BP Pulse Ox


 


 97.2 F   94   16   127/91   96 


 


 12/16/19 11:08  12/16/19 11:08  12/16/19 11:08  12/16/19 11:08  12/16/19 11:08











Vital Signs Reviewed: Yes


Appearance: Positive: Well-Appearing - Pt. sitting up in bed in NAD. Mother 

present.


Skin: Positive: Warm, Dry


Head/Face: Positive: Normal Head/Face Inspection


Eyes: Positive: Normal, EOMI, NAREN


ENT: Positive: Pharynx normal, TMs normal


Neck: Positive: Supple, Nontender.  Negative: Nuchal Rigidity


Respiratory/Lung Sounds: Positive: Other - Mild diminished breath sounds in 

bases.


Cardiovascular: Positive: Normal, RRR


Abdomen Description: Positive: Nontender, Soft


Neurological: Positive: Normal, CN Intact II-III


Psychiatric: Positive: Affect/Mood Appropriate





Procedures





- Sedation


Patient Received Moderate/Deep Sedation with Procedure: No





Diagnostics





- Vital Signs


 Vital Signs











  Temp Pulse Resp BP Pulse Ox


 


 12/16/19 11:08  97.2 F  94  16  127/91  96














- Laboratory


Lab Statement: Any lab studies that have been ordered have been reviewed, and 

results considered in the medical decision making process.





Disposition





- Course


Course Of Treatment: .  CXR per radiology: IMPRESSION:  PATCHY RIGHT MIDLUNG 

CONSOLIDATION. RECOMMEND FOLLOW-UP UNTIL RESOLUTION TO EXCLUDE.  UNDERLYING 

PULMONARY PARENCHYMAL PATHOLOGY.  Pt. with numerous antibx allergies. Will tx 

with azithromycin. Pt. will f.u with pcp and return to er ifs xs change or 

worsen. Pt .understands and agrees with plan.





- Differential Dx - Cardiopulmonary


Differential Diagnoses - Cardiopulmonary: Bronchitis, Influenza, Lower Resp 

Infection





- Diagnoses


Provider Diagnoses: 


 Pneumonia








Discharge ED





- Sign-Out/Discharge


Documenting (check all that apply): Patient Departure





- Discharge Plan


Condition: Good


Disposition: HOME


Prescriptions: 


Azithromycin TAB* [Zithromax TAB (Z-CALI) 250 mg #6 tabs] 2 tab PO .TODAY, THEN 

1 DAILY #1 cali


Patient Education Materials:  Pneumonia (ED)


Referrals: 


Wes Mac DO [Primary Care Provider] - 


Additional Instructions: 


Follow up with PCP in 2-3 days


Antibiotic as directed


Continue breathing treatments as directed


Tylenol or Motrin for pain as directed


Return to ER if symptoms change or worsen





- Billing Disposition and Condition


Condition: GOOD


Disposition: Home